# Patient Record
Sex: FEMALE | Race: WHITE | NOT HISPANIC OR LATINO | Employment: OTHER | URBAN - METROPOLITAN AREA
[De-identification: names, ages, dates, MRNs, and addresses within clinical notes are randomized per-mention and may not be internally consistent; named-entity substitution may affect disease eponyms.]

---

## 2023-06-26 ENCOUNTER — APPOINTMENT (OUTPATIENT)
Dept: PRIMARY CARE | Facility: CLINIC | Age: 78
End: 2023-06-26
Payer: MEDICARE

## 2023-07-11 DIAGNOSIS — I10 ESSENTIAL (PRIMARY) HYPERTENSION: ICD-10-CM

## 2023-07-11 DIAGNOSIS — K21.9 GASTROESOPHAGEAL REFLUX DISEASE WITHOUT ESOPHAGITIS: Primary | ICD-10-CM

## 2023-07-11 RX ORDER — TRIAMTERENE/HYDROCHLOROTHIAZID 37.5-25 MG
TABLET ORAL
Qty: 90 TABLET | Refills: 1 | Status: SHIPPED | OUTPATIENT
Start: 2023-07-11

## 2023-07-11 RX ORDER — PANTOPRAZOLE SODIUM 40 MG/1
TABLET, DELAYED RELEASE ORAL
Qty: 90 TABLET | Refills: 1 | Status: SHIPPED | OUTPATIENT
Start: 2023-07-11 | End: 2024-01-02

## 2023-09-14 DIAGNOSIS — E78.5 HYPERLIPIDEMIA, UNSPECIFIED: ICD-10-CM

## 2023-09-14 RX ORDER — SIMVASTATIN 20 MG/1
20 TABLET, FILM COATED ORAL EVERY EVENING
Qty: 90 TABLET | Refills: 1 | Status: SHIPPED | OUTPATIENT
Start: 2023-09-14 | End: 2023-09-19 | Stop reason: SDUPTHER

## 2023-09-15 PROBLEM — M17.12 OSTEOARTHRITIS OF LEFT KNEE: Status: ACTIVE | Noted: 2023-09-15

## 2023-09-15 PROBLEM — J38.02 BILATERAL VOCAL CORD PARESIS: Status: ACTIVE | Noted: 2023-09-15

## 2023-09-15 PROBLEM — R21 RASH AND OTHER NONSPECIFIC SKIN ERUPTION: Status: ACTIVE | Noted: 2021-11-16

## 2023-09-15 PROBLEM — I83.018: Status: ACTIVE | Noted: 2021-11-16

## 2023-09-15 PROBLEM — R35.1 NOCTURIA: Status: ACTIVE | Noted: 2023-09-15

## 2023-09-15 PROBLEM — Z98.890 HISTORY OF REVERSE TOTAL REPLACEMENT OF RIGHT SHOULDER JOINT: Status: ACTIVE | Noted: 2023-09-15

## 2023-09-15 PROBLEM — G89.29 CHRONIC RIGHT SHOULDER PAIN: Status: ACTIVE | Noted: 2023-09-15

## 2023-09-15 PROBLEM — L03.119 CELLULITIS, LEG: Status: ACTIVE | Noted: 2023-09-15

## 2023-09-15 PROBLEM — M70.61 TROCHANTERIC BURSITIS OF RIGHT HIP: Status: ACTIVE | Noted: 2023-09-15

## 2023-09-15 PROBLEM — K21.9 ACID REFLUX: Status: ACTIVE | Noted: 2023-09-15

## 2023-09-15 PROBLEM — K22.70 BARRETT'S ESOPHAGUS: Status: ACTIVE | Noted: 2023-09-15
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
## 2023-09-15 PROBLEM — J45.20 MILD INTERMITTENT ASTHMA WITHOUT COMPLICATION (HHS-HCC): Status: ACTIVE | Noted: 2023-09-15

## 2023-09-15 PROBLEM — R60.9 EDEMA: Status: ACTIVE | Noted: 2023-09-15

## 2023-09-15 PROBLEM — R32 URINARY INCONTINENCE: Status: ACTIVE | Noted: 2023-09-15

## 2023-09-15 PROBLEM — N32.81 OVERACTIVE BLADDER: Status: ACTIVE | Noted: 2023-09-15

## 2023-09-15 PROBLEM — M19.019 SHOULDER ARTHRITIS: Status: ACTIVE | Noted: 2023-09-15

## 2023-09-15 PROBLEM — E28.39 HYPOESTROGENISM: Status: ACTIVE | Noted: 2023-09-15

## 2023-09-15 PROBLEM — N18.30 CHRONIC KIDNEY DISEASE, STAGE 3 (MULTI): Status: ACTIVE | Noted: 2023-09-15

## 2023-09-15 PROBLEM — R05.3 CHRONIC COUGH: Status: ACTIVE | Noted: 2023-09-15

## 2023-09-15 PROBLEM — M62.81 MUSCLE WEAKNESS: Status: ACTIVE | Noted: 2023-09-15

## 2023-09-15 PROBLEM — I48.91 ATRIAL FIBRILLATION (MULTI): Status: ACTIVE | Noted: 2023-09-15

## 2023-09-15 PROBLEM — E55.9 VITAMIN D DEFICIENCY: Status: ACTIVE | Noted: 2023-09-15

## 2023-09-15 PROBLEM — J38.3 GLOTTIC INSUFFICIENCY: Status: ACTIVE | Noted: 2023-09-15

## 2023-09-15 PROBLEM — K59.09 CHRONIC CONSTIPATION: Status: ACTIVE | Noted: 2023-09-15

## 2023-09-15 PROBLEM — R00.1 BRADYCARDIA: Status: ACTIVE | Noted: 2023-09-15

## 2023-09-15 PROBLEM — K20.90 ESOPHAGITIS: Status: ACTIVE | Noted: 2023-09-15

## 2023-09-15 PROBLEM — E66.9 OBESITY: Status: ACTIVE | Noted: 2023-09-15

## 2023-09-15 PROBLEM — R00.2 PALPITATIONS: Status: ACTIVE | Noted: 2023-09-15

## 2023-09-15 PROBLEM — R13.12 OROPHARYNGEAL DYSPHAGIA: Status: ACTIVE | Noted: 2023-09-15

## 2023-09-15 PROBLEM — R26.89 IMBALANCE: Status: ACTIVE | Noted: 2023-09-15

## 2023-09-15 PROBLEM — E78.5 HYPERLIPIDEMIA: Status: ACTIVE | Noted: 2023-09-15

## 2023-09-15 PROBLEM — M25.511 CHRONIC RIGHT SHOULDER PAIN: Status: ACTIVE | Noted: 2023-09-15

## 2023-09-15 PROBLEM — N81.4 PROLAPSE, UTEROVAGINAL: Status: ACTIVE | Noted: 2023-09-15

## 2023-09-15 PROBLEM — K63.5 COLON POLYPS: Status: ACTIVE | Noted: 2023-09-15

## 2023-09-15 PROBLEM — R29.6 FALLING EPISODES: Status: ACTIVE | Noted: 2023-09-15

## 2023-09-15 PROBLEM — L50.8 URTICARIA, ACUTE: Status: ACTIVE | Noted: 2023-09-15

## 2023-09-15 PROBLEM — N95.0 POSTMENOPAUSAL BLEEDING: Status: ACTIVE | Noted: 2023-09-15

## 2023-09-15 PROBLEM — I10 HYPERTENSION: Status: ACTIVE | Noted: 2023-09-15

## 2023-09-15 PROBLEM — L27.0 GENERALIZED SKIN ERUPTION DUE TO DRUGS AND MEDICAMENTS TAKEN INTERNALLY: Status: ACTIVE | Noted: 2021-11-16

## 2023-09-15 PROBLEM — J30.2 OTHER SEASONAL ALLERGIC RHINITIS: Status: ACTIVE | Noted: 2023-09-15

## 2023-09-15 RX ORDER — CHLORHEXIDINE GLUCONATE ORAL RINSE 1.2 MG/ML
SOLUTION DENTAL
COMMUNITY
Start: 2022-10-11 | End: 2023-09-19 | Stop reason: ALTCHOICE

## 2023-09-15 RX ORDER — CEPHALEXIN 500 MG/1
500 CAPSULE ORAL 2 TIMES DAILY
COMMUNITY
Start: 2023-04-14 | End: 2023-09-19 | Stop reason: ALTCHOICE

## 2023-09-15 RX ORDER — GENTAMICIN SULFATE 1 MG/G
CREAM TOPICAL
COMMUNITY
Start: 2021-11-23 | End: 2023-09-19 | Stop reason: ALTCHOICE

## 2023-09-15 RX ORDER — HYDROXYZINE HYDROCHLORIDE 25 MG/1
25 TABLET, FILM COATED ORAL
COMMUNITY
Start: 2021-11-02 | End: 2024-03-26 | Stop reason: SINTOL

## 2023-09-15 RX ORDER — DOCUSATE SODIUM 100 MG/1
100 CAPSULE, LIQUID FILLED ORAL 2 TIMES DAILY
COMMUNITY
Start: 2022-10-21

## 2023-09-15 RX ORDER — TRIAMCINOLONE ACETONIDE 1 MG/G
1 CREAM TOPICAL
COMMUNITY
Start: 2021-11-16 | End: 2023-09-19 | Stop reason: ALTCHOICE

## 2023-09-15 RX ORDER — FAMOTIDINE 40 MG/1
1 TABLET, FILM COATED ORAL DAILY
COMMUNITY
Start: 2022-01-25 | End: 2024-01-02

## 2023-09-15 RX ORDER — ELECTROLYTES/DEXTROSE
1 SOLUTION, ORAL ORAL DAILY
COMMUNITY

## 2023-09-15 RX ORDER — APIXABAN 5 MG/1
5 TABLET, FILM COATED ORAL 2 TIMES DAILY
COMMUNITY
Start: 2022-11-18 | End: 2023-11-03 | Stop reason: SDUPTHER

## 2023-09-15 RX ORDER — METOPROLOL SUCCINATE 25 MG/1
1 TABLET, EXTENDED RELEASE ORAL DAILY
COMMUNITY
Start: 2022-10-24 | End: 2023-11-03 | Stop reason: SDUPTHER

## 2023-09-15 RX ORDER — ALBUTEROL SULFATE 90 UG/1
2 AEROSOL, METERED RESPIRATORY (INHALATION) EVERY 6 HOURS PRN
COMMUNITY
End: 2023-09-19 | Stop reason: ALTCHOICE

## 2023-09-15 RX ORDER — ACETAMINOPHEN 325 MG/1
325 TABLET ORAL EVERY 6 HOURS PRN
COMMUNITY
Start: 2022-10-21

## 2023-09-19 ENCOUNTER — LAB (OUTPATIENT)
Dept: LAB | Facility: LAB | Age: 78
End: 2023-09-19
Payer: MEDICARE

## 2023-09-19 ENCOUNTER — OFFICE VISIT (OUTPATIENT)
Dept: PRIMARY CARE | Facility: CLINIC | Age: 78
End: 2023-09-19
Payer: MEDICARE

## 2023-09-19 VITALS
SYSTOLIC BLOOD PRESSURE: 125 MMHG | WEIGHT: 191 LBS | TEMPERATURE: 97.6 F | BODY MASS INDEX: 31.78 KG/M2 | DIASTOLIC BLOOD PRESSURE: 61 MMHG | HEART RATE: 54 BPM

## 2023-09-19 DIAGNOSIS — E78.49 OTHER HYPERLIPIDEMIA: ICD-10-CM

## 2023-09-19 DIAGNOSIS — I10 PRIMARY HYPERTENSION: ICD-10-CM

## 2023-09-19 DIAGNOSIS — I83.018: ICD-10-CM

## 2023-09-19 DIAGNOSIS — I48.20 CHRONIC ATRIAL FIBRILLATION (MULTI): ICD-10-CM

## 2023-09-19 DIAGNOSIS — N18.31 STAGE 3A CHRONIC KIDNEY DISEASE (MULTI): ICD-10-CM

## 2023-09-19 DIAGNOSIS — N39.3 STRESS INCONTINENCE OF URINE: ICD-10-CM

## 2023-09-19 DIAGNOSIS — I10 PRIMARY HYPERTENSION: Primary | ICD-10-CM

## 2023-09-19 DIAGNOSIS — K21.9 GASTROESOPHAGEAL REFLUX DISEASE WITHOUT ESOPHAGITIS: ICD-10-CM

## 2023-09-19 DIAGNOSIS — E78.5 HYPERLIPIDEMIA, UNSPECIFIED: ICD-10-CM

## 2023-09-19 LAB
ALANINE AMINOTRANSFERASE (SGPT) (U/L) IN SER/PLAS: 17 U/L (ref 7–45)
ALBUMIN (G/DL) IN SER/PLAS: 4.3 G/DL (ref 3.4–5)
ALKALINE PHOSPHATASE (U/L) IN SER/PLAS: 84 U/L (ref 33–136)
ANION GAP IN SER/PLAS: 15 MMOL/L (ref 10–20)
ASPARTATE AMINOTRANSFERASE (SGOT) (U/L) IN SER/PLAS: 22 U/L (ref 9–39)
BILIRUBIN TOTAL (MG/DL) IN SER/PLAS: 0.6 MG/DL (ref 0–1.2)
CALCIUM (MG/DL) IN SER/PLAS: 10.3 MG/DL (ref 8.6–10.6)
CARBON DIOXIDE, TOTAL (MMOL/L) IN SER/PLAS: 28 MMOL/L (ref 21–32)
CHLORIDE (MMOL/L) IN SER/PLAS: 104 MMOL/L (ref 98–107)
CREATININE (MG/DL) IN SER/PLAS: 0.83 MG/DL (ref 0.5–1.05)
GFR FEMALE: 71 ML/MIN/1.73M2
GLUCOSE (MG/DL) IN SER/PLAS: 81 MG/DL (ref 74–99)
POTASSIUM (MMOL/L) IN SER/PLAS: 4.7 MMOL/L (ref 3.5–5.3)
PROTEIN TOTAL: 7.4 G/DL (ref 6.4–8.2)
SODIUM (MMOL/L) IN SER/PLAS: 142 MMOL/L (ref 136–145)
UREA NITROGEN (MG/DL) IN SER/PLAS: 24 MG/DL (ref 6–23)

## 2023-09-19 PROCEDURE — 1036F TOBACCO NON-USER: CPT | Performed by: INTERNAL MEDICINE

## 2023-09-19 PROCEDURE — 3078F DIAST BP <80 MM HG: CPT | Performed by: INTERNAL MEDICINE

## 2023-09-19 PROCEDURE — 80053 COMPREHEN METABOLIC PANEL: CPT

## 2023-09-19 PROCEDURE — 3074F SYST BP LT 130 MM HG: CPT | Performed by: INTERNAL MEDICINE

## 2023-09-19 PROCEDURE — 99213 OFFICE O/P EST LOW 20 MIN: CPT | Performed by: INTERNAL MEDICINE

## 2023-09-19 PROCEDURE — 1126F AMNT PAIN NOTED NONE PRSNT: CPT | Performed by: INTERNAL MEDICINE

## 2023-09-19 PROCEDURE — 36415 COLL VENOUS BLD VENIPUNCTURE: CPT

## 2023-09-19 RX ORDER — SIMVASTATIN 20 MG/1
20 TABLET, FILM COATED ORAL EVERY EVENING
Qty: 90 TABLET | Refills: 1 | Status: SHIPPED | OUTPATIENT
Start: 2023-09-19 | End: 2024-01-19

## 2023-09-19 ASSESSMENT — ENCOUNTER SYMPTOMS
NECK PAIN: 0
COUGH: 0
FEVER: 0
ARTHRALGIAS: 1
SHORTNESS OF BREATH: 0
TROUBLE SWALLOWING: 1
OCCASIONAL FEELINGS OF UNSTEADINESS: 0
PALPITATIONS: 0
DYSURIA: 0
FREQUENCY: 0
CONSTIPATION: 0
FATIGUE: 0
BLOOD IN STOOL: 0
HEADACHES: 0
DIARRHEA: 0
ABDOMINAL PAIN: 0
BACK PAIN: 0
MYALGIAS: 0
CHILLS: 0
LOSS OF SENSATION IN FEET: 0
DEPRESSION: 0
DIZZINESS: 0

## 2023-09-19 NOTE — PROGRESS NOTES
Subjective   Patient ID: Magali Cardenas is a 77 y.o. female.    HPI patient seen for routine follow-up.  His medical history significant for hypertension, hyperlipidemia, chronic atrial fibrillation, GERD, weakness, difficulty walking.  She had a slight fall at home.  She is currently using walker.  She is experiencing worsening pain in her right knee and is currently seeing orthopedics Dr. Braswell.  Review of Systems   Constitutional:  Negative for chills, fatigue and fever.   HENT:  Positive for trouble swallowing.    Respiratory:  Negative for cough and shortness of breath.    Cardiovascular:  Negative for chest pain, palpitations and leg swelling.   Gastrointestinal:  Negative for abdominal pain, blood in stool, constipation and diarrhea.   Endocrine: Negative for cold intolerance and heat intolerance.   Genitourinary:  Positive for urgency. Negative for dysuria and frequency.   Musculoskeletal:  Positive for arthralgias and gait problem. Negative for back pain, myalgias and neck pain.        R knee pain   Neurological:  Negative for dizziness and headaches.       Objective   Physical Exam  Vitals reviewed.   Constitutional:       General: She is not in acute distress.     Appearance: Normal appearance.   HENT:      Head: Normocephalic and atraumatic.   Cardiovascular:      Rate and Rhythm: Normal rate and regular rhythm.      Pulses: Normal pulses.   Pulmonary:      Effort: Pulmonary effort is normal. No respiratory distress.      Breath sounds: Normal breath sounds.   Abdominal:      General: Bowel sounds are normal. There is no distension.      Tenderness: There is no abdominal tenderness.   Musculoskeletal:         General: No swelling or tenderness. Normal range of motion.      Cervical back: Normal range of motion.   Skin:     General: Skin is warm.   Neurological:      General: No focal deficit present.      Mental Status: She is alert.      Motor: Weakness present.      Coordination: Coordination  normal.      Gait: Gait abnormal.   Psychiatric:         Mood and Affect: Mood normal.         Behavior: Behavior normal.         Assessment/Plan   Diagnoses and all orders for this visit:  Primary hypertension  Comments:  c/w mazxide  Orders:  -     Comprehensive Metabolic Panel; Future  Other hyperlipidemia  Comments:  c/w simvastatin  Orders:  -     simvastatin (Zocor) 20 mg tablet; Take 1 tablet (20 mg) by mouth once daily in the evening.  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

## 2023-10-17 ENCOUNTER — APPOINTMENT (OUTPATIENT)
Dept: ORTHOPEDIC SURGERY | Facility: CLINIC | Age: 78
End: 2023-10-17
Payer: MEDICARE

## 2023-10-31 ENCOUNTER — APPOINTMENT (OUTPATIENT)
Dept: ORTHOPEDIC SURGERY | Facility: CLINIC | Age: 78
End: 2023-10-31
Payer: MEDICARE

## 2023-11-02 ENCOUNTER — APPOINTMENT (OUTPATIENT)
Dept: ORTHOPEDIC SURGERY | Facility: CLINIC | Age: 78
End: 2023-11-02
Payer: MEDICARE

## 2023-11-03 DIAGNOSIS — I48.91 ATRIAL FIBRILLATION, UNSPECIFIED TYPE (MULTI): ICD-10-CM

## 2023-11-03 RX ORDER — METOPROLOL SUCCINATE 25 MG/1
25 TABLET, EXTENDED RELEASE ORAL DAILY
Qty: 90 TABLET | Refills: 3 | Status: SHIPPED | OUTPATIENT
Start: 2023-11-03 | End: 2023-11-21 | Stop reason: SDUPTHER

## 2023-11-03 RX ORDER — APIXABAN 5 MG/1
5 TABLET, FILM COATED ORAL 2 TIMES DAILY
Qty: 180 TABLET | Refills: 3 | Status: SHIPPED | OUTPATIENT
Start: 2023-11-03 | End: 2023-11-21 | Stop reason: SDUPTHER

## 2023-11-07 ENCOUNTER — OFFICE VISIT (OUTPATIENT)
Dept: ORTHOPEDIC SURGERY | Facility: HOSPITAL | Age: 78
End: 2023-11-07
Payer: MEDICARE

## 2023-11-07 ENCOUNTER — HOSPITAL ENCOUNTER (OUTPATIENT)
Dept: RADIOLOGY | Facility: HOSPITAL | Age: 78
Discharge: HOME | End: 2023-11-07
Payer: MEDICARE

## 2023-11-07 VITALS — BODY MASS INDEX: 31.65 KG/M2 | HEIGHT: 65 IN | WEIGHT: 190 LBS

## 2023-11-07 DIAGNOSIS — Z47.1 AFTERCARE FOLLOWING JOINT REPLACEMENT SURGERY, UNSPECIFIED JOINT: Primary | ICD-10-CM

## 2023-11-07 DIAGNOSIS — Z47.1 AFTERCARE FOLLOWING JOINT REPLACEMENT SURGERY, UNSPECIFIED JOINT: ICD-10-CM

## 2023-11-07 PROCEDURE — 99213 OFFICE O/P EST LOW 20 MIN: CPT | Performed by: NURSE PRACTITIONER

## 2023-11-07 PROCEDURE — 73030 X-RAY EXAM OF SHOULDER: CPT | Mod: RT,FY

## 2023-11-07 PROCEDURE — 1036F TOBACCO NON-USER: CPT | Performed by: NURSE PRACTITIONER

## 2023-11-07 PROCEDURE — 1126F AMNT PAIN NOTED NONE PRSNT: CPT | Performed by: NURSE PRACTITIONER

## 2023-11-07 PROCEDURE — 1159F MED LIST DOCD IN RCRD: CPT | Performed by: NURSE PRACTITIONER

## 2023-11-07 PROCEDURE — 1160F RVW MEDS BY RX/DR IN RCRD: CPT | Performed by: NURSE PRACTITIONER

## 2023-11-07 PROCEDURE — 73030 X-RAY EXAM OF SHOULDER: CPT | Mod: RIGHT SIDE | Performed by: RADIOLOGY

## 2023-11-07 ASSESSMENT — PAIN - FUNCTIONAL ASSESSMENT: PAIN_FUNCTIONAL_ASSESSMENT: NO/DENIES PAIN

## 2023-11-07 NOTE — PROGRESS NOTES
Provider Impression/Assessment:  BENITO is 12 months status post right reverse total shoulder arthroplasty.    Patient Discussion/Summary:  She has healed very nicely since surgery. She is encouraged to continue to work on scapular stabilizing exercises as well as stretching exercises. We talked again about no restrictions at this time. Emphasis on self-directed daily exercise program. Dental prophylaxis antibiotic needed prior to dental procedures in the future. She is aware that we will send prescription for antibiotic as needed, on going as needed.     At this point we can see her back as needed. All questions were answered today and she is comfortable with the above plan. Patient was discussed with Dr Hernandez and he agrees with the above plan.    Should you have any questions or concerns after your visit today, please do not hesitate to call the office at [phone]. I am honored to be a provider on your health care team and remain dedicated to helping you achieve your healthcare goals    ----------------------------------------------------------------------------------------------  CHIEF COMPLAINT:  FOLLOW UP RIGHT SHOULDER   POV R RTSR  DOS: 10/19/22      History of Present Illness  BENITO DONIS is a 78 year old right-hand-dominant female returning today for a follow up visit. She is status post right shoulder total reverse replacement and biceps tenodesis on 10/19/22.    She reports doing very well now since surgery. No complaints of pain today. No new concerns. She has some weakness with extension and overhead, but her range of motion has improved nicely since working with physical therapy. She is very happy now with the pain relief that she has gotten since replacement and improved range of motion. She denies use of any pain medication at this time for her shoulder. She is sleeping without difficulty. She denies redness or warmth at incision site. Denies any fever, chills, or paresthesia. She has  been able to actively maneuver with her rollator independently. She is able to complete her daily tasks of dressing and fixing her hair. She has completed therapy now. Exercising at home.     11/7/23  BENITO DONIS returns to clinic for her right shoulder. She is 1 year S/P right reverse total shoulder replacement, done 10/19/22. She is doing well and denies any new injury to her shoulder. No issues or concerns to report.     Past medical history, surgical history, social history, and family history were all reviewed and are as per the [place] patient health history questionnaire form that was signed and scanned into the chart.     Review of Systems:   Review of systems for all 14 systems is negative for complaint except for the above noted findings in the history of present illness.    Family, social, and medical histories are obtained and reviewed.    Diagnoses/Problems:   · Follow-up examination after orthopedic surgery (V67.09) (Z09)   · Shoulder arthritis (716.91) (M19.019)    Physical Examination:  Patient is a well-developed, well-nourished female in no acute distress. Breathes with normal chest rises. Pupils are round and symmetric today. Awake, alert, and oriented x3.      Examination of the right shoulder today reveals the skin to be intact. Well healed incision. Patient had 5 out of 5 wrist flexors extension, thumb extension, bilaterally. Full active range of motion of elbow. Range of motion revealed 150° of forward elevation actively without pain. 40° of external rotation. Internal rotation to L3. Abduction to 140°.      Results/Data  Xrays today show excellent alignment status post reverse shoulder replacement, showing healed subscapularis. No evidence of fracture, dislocation of other acute findings. I personally spent time viewing digital images of the radiology testing with the patient.     *While intending to generate a timely document that accurately reflects the content of the visit, no guarantee  can be provided that every grammatical or spelling mistake has been or will be identified or corrected. Thank you for your understanding.

## 2023-11-09 ENCOUNTER — OFFICE VISIT (OUTPATIENT)
Dept: ORTHOPEDIC SURGERY | Facility: CLINIC | Age: 78
End: 2023-11-09
Payer: MEDICARE

## 2023-11-09 ENCOUNTER — ANCILLARY PROCEDURE (OUTPATIENT)
Dept: RADIOLOGY | Facility: CLINIC | Age: 78
End: 2023-11-09
Payer: MEDICARE

## 2023-11-09 DIAGNOSIS — M70.61 TROCHANTERIC BURSITIS, RIGHT HIP: Primary | ICD-10-CM

## 2023-11-09 DIAGNOSIS — Z47.1 AFTERCARE FOLLOWING JOINT REPLACEMENT SURGERY, UNSPECIFIED JOINT: ICD-10-CM

## 2023-11-09 DIAGNOSIS — M70.61 TROCHANTERIC BURSITIS OF RIGHT HIP: ICD-10-CM

## 2023-11-09 PROCEDURE — 1126F AMNT PAIN NOTED NONE PRSNT: CPT | Performed by: ORTHOPAEDIC SURGERY

## 2023-11-09 PROCEDURE — 1159F MED LIST DOCD IN RCRD: CPT | Performed by: ORTHOPAEDIC SURGERY

## 2023-11-09 PROCEDURE — 3078F DIAST BP <80 MM HG: CPT | Performed by: ORTHOPAEDIC SURGERY

## 2023-11-09 PROCEDURE — 73562 X-RAY EXAM OF KNEE 3: CPT | Mod: RIGHT SIDE | Performed by: RADIOLOGY

## 2023-11-09 PROCEDURE — 73562 X-RAY EXAM OF KNEE 3: CPT | Mod: RT

## 2023-11-09 PROCEDURE — 2500000005 HC RX 250 GENERAL PHARMACY W/O HCPCS: Performed by: ORTHOPAEDIC SURGERY

## 2023-11-09 PROCEDURE — 3074F SYST BP LT 130 MM HG: CPT | Performed by: ORTHOPAEDIC SURGERY

## 2023-11-09 PROCEDURE — 1036F TOBACCO NON-USER: CPT | Performed by: ORTHOPAEDIC SURGERY

## 2023-11-09 PROCEDURE — 99213 OFFICE O/P EST LOW 20 MIN: CPT | Mod: 25 | Performed by: ORTHOPAEDIC SURGERY

## 2023-11-09 PROCEDURE — 99213 OFFICE O/P EST LOW 20 MIN: CPT | Performed by: ORTHOPAEDIC SURGERY

## 2023-11-09 PROCEDURE — 2500000004 HC RX 250 GENERAL PHARMACY W/ HCPCS (ALT 636 FOR OP/ED): Performed by: ORTHOPAEDIC SURGERY

## 2023-11-10 PROCEDURE — 20610 DRAIN/INJ JOINT/BURSA W/O US: CPT | Performed by: ORTHOPAEDIC SURGERY

## 2023-11-10 RX ORDER — TRIAMCINOLONE ACETONIDE 40 MG/ML
2 INJECTION, SUSPENSION INTRA-ARTICULAR; INTRAMUSCULAR
Status: COMPLETED | OUTPATIENT
Start: 2023-11-10 | End: 2023-11-10

## 2023-11-10 RX ORDER — LIDOCAINE HYDROCHLORIDE 10 MG/ML
8 INJECTION INFILTRATION; PERINEURAL
Status: COMPLETED | OUTPATIENT
Start: 2023-11-10 | End: 2023-11-10

## 2023-11-10 RX ADMIN — TRIAMCINOLONE ACETONIDE 2 ML: 40 INJECTION, SUSPENSION INTRA-ARTICULAR; INTRAMUSCULAR at 12:19

## 2023-11-10 RX ADMIN — LIDOCAINE HYDROCHLORIDE 8 ML: 10 INJECTION, SOLUTION INFILTRATION; PERINEURAL at 12:19

## 2023-11-10 NOTE — PROGRESS NOTES
Patient is a 78-year-old female presents today for evaluation of right leg pain.  She points to the lateral aspect of her leg beginning at the trochanter extending down to her knee.  She does have some aches and pains around the knee.  She had previous right total knee replacement with Dr. Flynn.  She had no postoperative complications.    Right leg:  AAOx3, NAD, walks with a moderate antalgic gait  No pain with flexion internal rotation  Negative Stinchfield  Moderate TTP over trochanter laterally which she says reproduces her pain  5/5 Hip flexion/knee extension/df/pf/ehl  SILT in a luzma/saph/per/tib distribution  ½ dorsalis pedis and posterior tibial pulse  no popliteal or inguinal lymphadenopathy  no other overlying lesions  mood: euthymic  Respirations non labored    Plain films of her total knee arthroplasty were reviewed with myself in clinic today.  She has a cemented cruciate retaining [place] triathlon in place with no signs of loosening or failure.  Her x-rays are unchanged from previous.    I discussed that I think most of this is peritrochanteric soft tissue pain extending into her iliotibial band.  She underwent a Trope injection today in clinic.  Hopefully this will provide some relief.  We will see how she does over time.  She is not recommending surgical at this time.  All questions were answered.    L Inj/Asp: R greater trochanteric bursa on 11/10/2023 12:19 PM  Indications: pain  Details: 22 G (Spinal Needle) needle, lateral approach  Medications: 2 mL triamcinolone acetonide 40 mg/mL; 8 mL lidocaine 10 mg/mL (1 %)      Greater Trochanteric bursitis cortisone injection procedure note:  Discussion:  I discussed the conservative treatment options for Greater Trochanteric Bursitis including but not limited to physical therapy, oral NSAIDS, activity and lifestyle modification, and corticosteroid injections. Pt has elected to try a cortisone injection today. I have explained the risk and benefits of an  injection including the possibility of joint infection, bleeding, damage to cartilage, allergic reaction. Patient verbalized understanding and gave verbal consent wishes to proceed with a intraarticular cortisone injection for their peribursal area.    Procedure    With the patient's informed verbal consent, the right hip greater trochanteric area was prepped in standard sterile fashion with Chlorhexidine in a sidelying position. The skin was then anesthetized with ethyl chloride spray and cleaned again with Chlorhexidine. The peritrochanteric soft tissue was then injected with a prefilled spinal needle syringe of 80 mg Kenalog +8 ml Lidocaine without complications.  The patient tolerated this well and felt immediate initial relief of symptoms. A bandaid was applied and the patient ambulated out of the clinic on ther own accord without difficulty. Patient should contact the office if any signs of of infection appear: redness, fever, chills, drainage, swelling or warmth to the knees.  Pt understands that the injections can be repeated no sooner than 3 months.      Procedure, treatment alternatives, risks and benefits explained, specific risks discussed. Consent was given by the patient. Immediately prior to procedure a time out was called to verify the correct patient, procedure, equipment, support staff and site/side marked as required. Patient was prepped and draped in the usual sterile fashion.

## 2023-11-21 DIAGNOSIS — I48.91 ATRIAL FIBRILLATION, UNSPECIFIED TYPE (MULTI): ICD-10-CM

## 2023-11-21 PROBLEM — Z47.1 AFTERCARE FOLLOWING JOINT REPLACEMENT SURGERY: Status: ACTIVE | Noted: 2023-11-21

## 2023-11-21 RX ORDER — APIXABAN 5 MG/1
5 TABLET, FILM COATED ORAL 2 TIMES DAILY
Qty: 180 TABLET | Refills: 3 | Status: SHIPPED | OUTPATIENT
Start: 2023-11-21

## 2023-11-21 RX ORDER — PHENYLPROPANOLAMINE/CLEMASTINE 75-1.34MG
200 TABLET, EXTENDED RELEASE ORAL 4 TIMES DAILY PRN
COMMUNITY

## 2023-11-21 RX ORDER — VIT C/E/ZN/COPPR/LUTEIN/ZEAXAN 250MG-90MG
1000 CAPSULE ORAL
COMMUNITY

## 2023-11-21 RX ORDER — METOPROLOL SUCCINATE 25 MG/1
25 TABLET, EXTENDED RELEASE ORAL DAILY
Qty: 90 TABLET | Refills: 3 | Status: SHIPPED | OUTPATIENT
Start: 2023-11-21

## 2023-11-21 RX ORDER — SOLIFENACIN SUCCINATE 10 MG/1
5 TABLET, FILM COATED ORAL
COMMUNITY
End: 2024-03-26 | Stop reason: SINTOL

## 2023-12-28 DIAGNOSIS — K21.9 GASTROESOPHAGEAL REFLUX DISEASE WITHOUT ESOPHAGITIS: ICD-10-CM

## 2024-01-02 RX ORDER — FAMOTIDINE 40 MG/1
40 TABLET, FILM COATED ORAL DAILY
Qty: 90 TABLET | Refills: 2 | Status: SHIPPED | OUTPATIENT
Start: 2024-01-02

## 2024-01-02 RX ORDER — PANTOPRAZOLE SODIUM 40 MG/1
TABLET, DELAYED RELEASE ORAL
Qty: 90 TABLET | Refills: 2 | Status: SHIPPED | OUTPATIENT
Start: 2024-01-02

## 2024-01-18 DIAGNOSIS — E78.5 HYPERLIPIDEMIA, UNSPECIFIED: ICD-10-CM

## 2024-01-18 DIAGNOSIS — E78.49 OTHER HYPERLIPIDEMIA: ICD-10-CM

## 2024-01-19 RX ORDER — SIMVASTATIN 20 MG/1
20 TABLET, FILM COATED ORAL EVERY EVENING
Qty: 90 TABLET | Refills: 2 | Status: SHIPPED | OUTPATIENT
Start: 2024-01-19

## 2024-03-26 ENCOUNTER — OFFICE VISIT (OUTPATIENT)
Dept: PRIMARY CARE | Facility: CLINIC | Age: 79
End: 2024-03-26
Payer: MEDICARE

## 2024-03-26 ENCOUNTER — APPOINTMENT (OUTPATIENT)
Dept: LAB | Facility: LAB | Age: 79
End: 2024-03-26
Payer: MEDICARE

## 2024-03-26 ENCOUNTER — LAB (OUTPATIENT)
Dept: LAB | Facility: LAB | Age: 79
End: 2024-03-26
Payer: MEDICARE

## 2024-03-26 VITALS
HEIGHT: 64 IN | SYSTOLIC BLOOD PRESSURE: 123 MMHG | BODY MASS INDEX: 32.27 KG/M2 | DIASTOLIC BLOOD PRESSURE: 59 MMHG | HEART RATE: 53 BPM | WEIGHT: 189 LBS | TEMPERATURE: 97.8 F

## 2024-03-26 DIAGNOSIS — R73.09 ELEVATED GLUCOSE LEVEL: ICD-10-CM

## 2024-03-26 DIAGNOSIS — Z00.00 MEDICARE ANNUAL WELLNESS VISIT, SUBSEQUENT: Primary | ICD-10-CM

## 2024-03-26 DIAGNOSIS — I10 PRIMARY HYPERTENSION: ICD-10-CM

## 2024-03-26 DIAGNOSIS — N18.31 STAGE 3A CHRONIC KIDNEY DISEASE (MULTI): ICD-10-CM

## 2024-03-26 DIAGNOSIS — M79.604 RIGHT LEG PAIN: ICD-10-CM

## 2024-03-26 DIAGNOSIS — I83.018: ICD-10-CM

## 2024-03-26 DIAGNOSIS — I48.20 CHRONIC ATRIAL FIBRILLATION (MULTI): ICD-10-CM

## 2024-03-26 DIAGNOSIS — E78.49 OTHER HYPERLIPIDEMIA: ICD-10-CM

## 2024-03-26 PROBLEM — Z47.1 AFTERCARE FOLLOWING JOINT REPLACEMENT SURGERY: Status: RESOLVED | Noted: 2023-11-21 | Resolved: 2024-03-26

## 2024-03-26 PROBLEM — R00.1 BRADYCARDIA: Status: RESOLVED | Noted: 2023-09-15 | Resolved: 2024-03-26

## 2024-03-26 PROBLEM — L03.119 CELLULITIS, LEG: Status: RESOLVED | Noted: 2023-09-15 | Resolved: 2024-03-26

## 2024-03-26 PROBLEM — R00.2 PALPITATIONS: Status: RESOLVED | Noted: 2023-09-15 | Resolved: 2024-03-26
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
## 2024-03-26 PROBLEM — K20.90 ESOPHAGITIS: Status: RESOLVED | Noted: 2023-09-15 | Resolved: 2024-03-26

## 2024-03-26 LAB
ALBUMIN SERPL BCP-MCNC: 4.4 G/DL (ref 3.4–5)
ALP SERPL-CCNC: 74 U/L (ref 33–136)
ALT SERPL W P-5'-P-CCNC: 15 U/L (ref 7–45)
ANION GAP SERPL CALC-SCNC: 16 MMOL/L (ref 10–20)
AST SERPL W P-5'-P-CCNC: 20 U/L (ref 9–39)
BILIRUB SERPL-MCNC: 0.6 MG/DL (ref 0–1.2)
BUN SERPL-MCNC: 24 MG/DL (ref 6–23)
CALCIUM SERPL-MCNC: 10.4 MG/DL (ref 8.6–10.6)
CHLORIDE SERPL-SCNC: 101 MMOL/L (ref 98–107)
CO2 SERPL-SCNC: 25 MMOL/L (ref 21–32)
CREAT SERPL-MCNC: 0.97 MG/DL (ref 0.5–1.05)
EGFRCR SERPLBLD CKD-EPI 2021: 59 ML/MIN/1.73M*2
ERYTHROCYTE [DISTWIDTH] IN BLOOD BY AUTOMATED COUNT: 12.1 % (ref 11.5–14.5)
EST. AVERAGE GLUCOSE BLD GHB EST-MCNC: 100 MG/DL
GLUCOSE SERPL-MCNC: 81 MG/DL (ref 74–99)
HBA1C MFR BLD: 5.1 %
HCT VFR BLD AUTO: 43.8 % (ref 36–46)
HGB BLD-MCNC: 14 G/DL (ref 12–16)
MCH RBC QN AUTO: 31 PG (ref 26–34)
MCHC RBC AUTO-ENTMCNC: 32 G/DL (ref 32–36)
MCV RBC AUTO: 97 FL (ref 80–100)
NRBC BLD-RTO: 0 /100 WBCS (ref 0–0)
PLATELET # BLD AUTO: 308 X10*3/UL (ref 150–450)
POTASSIUM SERPL-SCNC: 4.9 MMOL/L (ref 3.5–5.3)
PROT SERPL-MCNC: 7.2 G/DL (ref 6.4–8.2)
RBC # BLD AUTO: 4.52 X10*6/UL (ref 4–5.2)
SODIUM SERPL-SCNC: 137 MMOL/L (ref 136–145)
TSH SERPL-ACNC: 1.83 MIU/L (ref 0.44–3.98)
WBC # BLD AUTO: 6.2 X10*3/UL (ref 4.4–11.3)

## 2024-03-26 PROCEDURE — 83036 HEMOGLOBIN GLYCOSYLATED A1C: CPT

## 2024-03-26 PROCEDURE — 1170F FXNL STATUS ASSESSED: CPT | Performed by: INTERNAL MEDICINE

## 2024-03-26 PROCEDURE — G0439 PPPS, SUBSEQ VISIT: HCPCS | Performed by: INTERNAL MEDICINE

## 2024-03-26 PROCEDURE — 99213 OFFICE O/P EST LOW 20 MIN: CPT | Performed by: INTERNAL MEDICINE

## 2024-03-26 PROCEDURE — 1160F RVW MEDS BY RX/DR IN RCRD: CPT | Performed by: INTERNAL MEDICINE

## 2024-03-26 PROCEDURE — 3078F DIAST BP <80 MM HG: CPT | Performed by: INTERNAL MEDICINE

## 2024-03-26 PROCEDURE — 1036F TOBACCO NON-USER: CPT | Performed by: INTERNAL MEDICINE

## 2024-03-26 PROCEDURE — 80053 COMPREHEN METABOLIC PANEL: CPT

## 2024-03-26 PROCEDURE — 85027 COMPLETE CBC AUTOMATED: CPT

## 2024-03-26 PROCEDURE — 84443 ASSAY THYROID STIM HORMONE: CPT

## 2024-03-26 PROCEDURE — 3074F SYST BP LT 130 MM HG: CPT | Performed by: INTERNAL MEDICINE

## 2024-03-26 PROCEDURE — 1159F MED LIST DOCD IN RCRD: CPT | Performed by: INTERNAL MEDICINE

## 2024-03-26 PROCEDURE — 36415 COLL VENOUS BLD VENIPUNCTURE: CPT

## 2024-03-26 RX ORDER — DICLOFENAC SODIUM 10 MG/G
4 GEL TOPICAL 4 TIMES DAILY
Qty: 100 G | Refills: 1 | Status: SHIPPED | OUTPATIENT
Start: 2024-03-26

## 2024-03-26 ASSESSMENT — ENCOUNTER SYMPTOMS
PALPITATIONS: 0
LIGHT-HEADEDNESS: 0
BACK PAIN: 0
OCCASIONAL FEELINGS OF UNSTEADINESS: 1
HEADACHES: 0
SLEEP DISTURBANCE: 0
NECK PAIN: 0
LOSS OF SENSATION IN FEET: 0
UNEXPECTED WEIGHT CHANGE: 0
DIZZINESS: 0
SORE THROAT: 0
DEPRESSION: 0
APPETITE CHANGE: 0
CHILLS: 0
FREQUENCY: 0
DYSURIA: 0
DECREASED CONCENTRATION: 0
WHEEZING: 0
SHORTNESS OF BREATH: 0
ARTHRALGIAS: 1
FLANK PAIN: 0
NERVOUS/ANXIOUS: 0
ABDOMINAL PAIN: 0
DIFFICULTY URINATING: 0
ACTIVITY CHANGE: 0
COUGH: 0
WEAKNESS: 1
BLOOD IN STOOL: 0
CHEST TIGHTNESS: 0

## 2024-03-26 ASSESSMENT — ACTIVITIES OF DAILY LIVING (ADL)
DOING_HOUSEWORK: INDEPENDENT
BATHING: INDEPENDENT
TAKING_MEDICATION: INDEPENDENT
GROCERY_SHOPPING: INDEPENDENT
DRESSING: INDEPENDENT
MANAGING_FINANCES: INDEPENDENT

## 2024-03-26 ASSESSMENT — PATIENT HEALTH QUESTIONNAIRE - PHQ9
1. LITTLE INTEREST OR PLEASURE IN DOING THINGS: NOT AT ALL
2. FEELING DOWN, DEPRESSED OR HOPELESS: MORE THAN HALF THE DAYS
10. IF YOU CHECKED OFF ANY PROBLEMS, HOW DIFFICULT HAVE THESE PROBLEMS MADE IT FOR YOU TO DO YOUR WORK, TAKE CARE OF THINGS AT HOME, OR GET ALONG WITH OTHER PEOPLE: SOMEWHAT DIFFICULT
SUM OF ALL RESPONSES TO PHQ9 QUESTIONS 1 AND 2: 2

## 2024-03-26 NOTE — PATIENT INSTRUCTIONS
Restart Eliquis at low-dose-2.5 mg twice a day  Do not take ibuprofen or other NSAIDS  Start Voltaren gel for your joint pains  Continue rest of the medications  Follow-up in 3 to 4 months

## 2024-03-26 NOTE — PROGRESS NOTES
"Subjective   Patient ID: Magali Cardenas is a 78 y.o. female who presents for Medicare Annual Wellness Visit Subsequent (Pt present today for wellness visit. ls).    HPI Ms. Cardenas is seen today for Medicare wellness visit and follow-up.  Her medical history significant for atrial fibrillation, hypertension, hyperlipidemia, imbalance.  She has been experiencing pain in her legs.  She was diagnosed with tendinitis in her right leg.  She started taking ibuprofen.  She stopped taking Eliquis.  She uses walker for ambulation.  She lives with her .    Review of Systems   Constitutional:  Negative for activity change, appetite change, chills and unexpected weight change.   HENT:  Negative for congestion, postnasal drip and sore throat.    Eyes:  Negative for visual disturbance.   Respiratory:  Negative for cough, chest tightness, shortness of breath and wheezing.    Cardiovascular:  Negative for chest pain, palpitations and leg swelling.   Gastrointestinal:  Negative for abdominal pain and blood in stool.   Endocrine: Negative for cold intolerance and heat intolerance.   Genitourinary:  Negative for difficulty urinating, dysuria, flank pain and frequency.   Musculoskeletal:  Positive for arthralgias. Negative for back pain, gait problem and neck pain.        R leg pain   Skin:  Negative for rash.   Allergic/Immunologic: Negative for environmental allergies and food allergies.   Neurological:  Positive for weakness. Negative for dizziness, light-headedness and headaches.        Diff walking   Psychiatric/Behavioral:  Negative for decreased concentration and sleep disturbance. The patient is not nervous/anxious.        Objective   /59   Pulse 53   Temp 36.6 °C (97.8 °F)   Ht 1.614 m (5' 3.54\")   Wt 85.7 kg (189 lb)   BMI 32.91 kg/m²     Physical Exam  Vitals reviewed.   Constitutional:       General: She is not in acute distress.     Appearance: Normal appearance.   HENT:      Head: Normocephalic and " atraumatic.   Cardiovascular:      Rate and Rhythm: Normal rate and regular rhythm.      Pulses: Normal pulses.   Pulmonary:      Effort: Pulmonary effort is normal. No respiratory distress.      Breath sounds: Normal breath sounds.   Abdominal:      General: Bowel sounds are normal. There is no distension.      Tenderness: There is no abdominal tenderness.   Musculoskeletal:         General: No swelling or tenderness. Normal range of motion.   Skin:     General: Skin is warm.   Neurological:      General: No focal deficit present.      Mental Status: She is alert.      Motor: Weakness present.      Coordination: Coordination normal.      Gait: Gait abnormal.   Psychiatric:         Mood and Affect: Mood normal.         Behavior: Behavior normal.         Assessment/Plan   Diagnoses and all orders for this visit:  Medicare annual wellness visit, subsequent  Comments:  Wellness visit completed  No care gaps found  Primary hypertension  Comments:  Stable  Orders:  -     CBC; Future  -     Comprehensive Metabolic Panel; Future  -     TSH with reflex to Free T4 if abnormal; Future  Other hyperlipidemia  Comments:  Stable  Chronic atrial fibrillation (CMS/[facility])  Comments:  Continue metoprolol  Restart Eliquis at low-dose-2.5 mg twice daily due to high fall risk and potential risk of bleeding  Right leg pain  Comments:  Tendinitis-apply Voltaren topical gel  Consider using ice or heat  Orders:  -     diclofenac sodium (Voltaren) 1 % gel; Apply 4.5 inches (4 g) topically 4 times a day.  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

## 2024-03-28 ENCOUNTER — TELEPHONE (OUTPATIENT)
Dept: PRIMARY CARE | Facility: CLINIC | Age: 79
End: 2024-03-28
Payer: MEDICARE

## 2024-05-31 PROBLEM — S01.91XA LACERATION OF HEAD: Status: ACTIVE | Noted: 2024-05-31

## 2024-05-31 PROBLEM — M25.519 SHOULDER PAIN: Status: ACTIVE | Noted: 2024-05-31

## 2024-05-31 PROBLEM — J38.3 DISORDER OF VOCAL CORD: Status: ACTIVE | Noted: 2023-09-15

## 2024-05-31 PROBLEM — R73.09 INCREASED GLUCOSE LEVEL: Status: ACTIVE | Noted: 2024-05-31

## 2024-05-31 PROBLEM — I70.203 ATHEROSCLEROSIS OF NATIVE ARTERY OF BOTH LOWER EXTREMITIES (CMS-HCC): Status: ACTIVE | Noted: 2019-03-26

## 2024-05-31 PROBLEM — L50.1 IDIOPATHIC URTICARIA: Status: ACTIVE | Noted: 2021-11-16

## 2024-05-31 PROBLEM — B35.1 ONYCHOMYCOSIS: Status: ACTIVE | Noted: 2019-03-26

## 2024-05-31 PROBLEM — I48.0 PAROXYSMAL ATRIAL FIBRILLATION WITH RAPID VENTRICULAR RESPONSE (MULTI): Status: ACTIVE | Noted: 2023-09-15

## 2024-05-31 PROBLEM — S00.93XA CONTUSION OF HEAD: Status: ACTIVE | Noted: 2024-05-31

## 2024-05-31 PROBLEM — M25.569 KNEE PAIN: Status: ACTIVE | Noted: 2024-05-31

## 2024-05-31 PROBLEM — J38.02 BILATERAL PARTIAL PARALYSIS OF VOCAL CORDS OR LARYNX: Status: ACTIVE | Noted: 2023-09-15

## 2024-05-31 PROBLEM — M19.071 ARTHRITIS OF FOOT, RIGHT: Status: ACTIVE | Noted: 2019-03-26

## 2024-05-31 PROBLEM — M79.671 PAIN IN RIGHT FOOT: Status: ACTIVE | Noted: 2019-03-26

## 2024-05-31 PROBLEM — M70.60 GREATER TROCHANTERIC BURSITIS: Status: ACTIVE | Noted: 2024-05-31

## 2024-05-31 PROBLEM — M25.559 ARTHRALGIA OF HIP: Status: ACTIVE | Noted: 2024-05-31

## 2024-06-20 PROBLEM — L27.0 GENERALIZED SKIN ERUPTION DUE TO DRUGS AND MEDICAMENTS TAKEN INTERNALLY: Status: RESOLVED | Noted: 2021-11-16 | Resolved: 2024-06-20

## 2024-06-20 PROBLEM — R21 RASH AND OTHER NONSPECIFIC SKIN ERUPTION: Status: RESOLVED | Noted: 2021-11-16 | Resolved: 2024-06-20

## 2024-06-20 PROBLEM — M25.519 SHOULDER PAIN: Status: RESOLVED | Noted: 2024-05-31 | Resolved: 2024-06-20

## 2024-06-20 PROBLEM — M79.671 PAIN IN RIGHT FOOT: Status: RESOLVED | Noted: 2019-03-26 | Resolved: 2024-06-20

## 2024-06-20 PROBLEM — J38.3 DISORDER OF VOCAL CORD: Status: RESOLVED | Noted: 2023-09-15 | Resolved: 2024-06-20

## 2024-06-20 PROBLEM — R73.09 INCREASED GLUCOSE LEVEL: Status: RESOLVED | Noted: 2024-05-31 | Resolved: 2024-06-20

## 2024-06-20 PROBLEM — S00.93XA CONTUSION OF HEAD: Status: RESOLVED | Noted: 2024-05-31 | Resolved: 2024-06-20

## 2024-06-20 PROBLEM — J38.02 BILATERAL VOCAL CORD PARESIS: Status: RESOLVED | Noted: 2023-09-15 | Resolved: 2024-06-20

## 2024-06-27 ENCOUNTER — APPOINTMENT (OUTPATIENT)
Dept: PRIMARY CARE | Facility: CLINIC | Age: 79
End: 2024-06-27
Payer: MEDICARE

## 2024-06-27 VITALS
DIASTOLIC BLOOD PRESSURE: 73 MMHG | BODY MASS INDEX: 31.76 KG/M2 | WEIGHT: 182.4 LBS | TEMPERATURE: 97.4 F | SYSTOLIC BLOOD PRESSURE: 132 MMHG | HEART RATE: 62 BPM

## 2024-06-27 DIAGNOSIS — G89.29 CHRONIC PAIN OF RIGHT KNEE: ICD-10-CM

## 2024-06-27 DIAGNOSIS — K21.9 GASTROESOPHAGEAL REFLUX DISEASE WITHOUT ESOPHAGITIS: ICD-10-CM

## 2024-06-27 DIAGNOSIS — M25.561 CHRONIC PAIN OF RIGHT KNEE: ICD-10-CM

## 2024-06-27 DIAGNOSIS — I10 PRIMARY HYPERTENSION: Primary | ICD-10-CM

## 2024-06-27 DIAGNOSIS — I48.20 CHRONIC ATRIAL FIBRILLATION (MULTI): ICD-10-CM

## 2024-06-27 DIAGNOSIS — E78.49 OTHER HYPERLIPIDEMIA: ICD-10-CM

## 2024-06-27 DIAGNOSIS — N18.31 STAGE 3A CHRONIC KIDNEY DISEASE (MULTI): ICD-10-CM

## 2024-06-27 PROCEDURE — 3075F SYST BP GE 130 - 139MM HG: CPT | Performed by: INTERNAL MEDICINE

## 2024-06-27 PROCEDURE — 1158F ADVNC CARE PLAN TLK DOCD: CPT | Performed by: INTERNAL MEDICINE

## 2024-06-27 PROCEDURE — 3078F DIAST BP <80 MM HG: CPT | Performed by: INTERNAL MEDICINE

## 2024-06-27 PROCEDURE — 1125F AMNT PAIN NOTED PAIN PRSNT: CPT | Performed by: INTERNAL MEDICINE

## 2024-06-27 PROCEDURE — 1123F ACP DISCUSS/DSCN MKR DOCD: CPT | Performed by: INTERNAL MEDICINE

## 2024-06-27 PROCEDURE — 99213 OFFICE O/P EST LOW 20 MIN: CPT | Performed by: INTERNAL MEDICINE

## 2024-06-27 RX ORDER — PANTOPRAZOLE SODIUM 40 MG/1
TABLET, DELAYED RELEASE ORAL
Qty: 90 TABLET | Refills: 2 | Status: SHIPPED | OUTPATIENT
Start: 2024-06-27

## 2024-06-27 RX ORDER — FAMOTIDINE 40 MG/1
40 TABLET, FILM COATED ORAL DAILY
Qty: 90 TABLET | Refills: 2 | Status: SHIPPED | OUTPATIENT
Start: 2024-06-27

## 2024-06-27 ASSESSMENT — ENCOUNTER SYMPTOMS
UNEXPECTED WEIGHT CHANGE: 0
APPETITE CHANGE: 0
ACTIVITY CHANGE: 1
PALPITATIONS: 0
ABDOMINAL PAIN: 0
COUGH: 0
DYSURIA: 0
NECK PAIN: 0
SORE THROAT: 1
LIGHT-HEADEDNESS: 0
DIZZINESS: 0
ARTHRALGIAS: 1
FREQUENCY: 0
SLEEP DISTURBANCE: 0
SHORTNESS OF BREATH: 0
HEADACHES: 0
WHEEZING: 0
DIFFICULTY URINATING: 0
BACK PAIN: 0
NERVOUS/ANXIOUS: 0
CHILLS: 0
WEAKNESS: 0
FLANK PAIN: 0
BLOOD IN STOOL: 0
DECREASED CONCENTRATION: 0
CHEST TIGHTNESS: 0

## 2024-06-27 ASSESSMENT — PAIN SCALES - GENERAL: PAINLEVEL: 6

## 2024-06-27 NOTE — PATIENT INSTRUCTIONS
Use Bengay patch for knee pain and start water therapy/physical therapy. Begin magnesium and omega-3 supplements. Consider gummy forms if preferred.  Lower simvastatin to half- 10 mg.   Follow in 4 mths

## 2024-06-27 NOTE — PROGRESS NOTES
Subjective   Patient ID: Magali Cardenas is a 78 y.o. female who presents for Follow-up.    HPI-patient is seen today for complaints of pain in her right hip and right knee. His pain is getting worse, difficulty ambulating and difficulty getting out of the house due to pain and mobility. She is so orthopedics who recommended physical therapy and water therapy.  She complains of frequent heartburn and takes pantoprazole and famotidine.  No injuries or falls. She uses walker. She lives with her .  She has hypertension, hyperlipidemia, atrial fibrillation-takes all medications    Review of Systems   Constitutional:  Positive for activity change. Negative for appetite change, chills and unexpected weight change.   HENT:  Positive for postnasal drip and sore throat. Negative for congestion.         Thick mucous coming to back of throat. Endorses erythema of tongue.   Eyes:  Negative for visual disturbance.   Respiratory:  Negative for cough, chest tightness, shortness of breath and wheezing.    Cardiovascular:  Negative for chest pain, palpitations and leg swelling.   Gastrointestinal:  Negative for abdominal pain and blood in stool.   Endocrine: Negative for cold intolerance and heat intolerance.   Genitourinary:  Negative for difficulty urinating, dysuria, flank pain and frequency.   Musculoskeletal:  Positive for arthralgias and gait problem. Negative for back pain and neck pain.        Painful right hip and knee. Difficulty walking (right knee cannot bear weight)   Skin:  Negative for rash.   Allergic/Immunologic: Negative for environmental allergies and food allergies.   Neurological:  Negative for dizziness, weakness, light-headedness and headaches.   Psychiatric/Behavioral:  Negative for decreased concentration and sleep disturbance. The patient is not nervous/anxious.        Objective   /73   Pulse 62   Temp 36.3 °C (97.4 °F)   Wt 82.7 kg (182 lb 6.4 oz)   BMI 31.76 kg/m²     Physical  Exam  Vitals reviewed.   Constitutional:       General: She is not in acute distress.     Appearance: Normal appearance.   HENT:      Head: Normocephalic and atraumatic.      Mouth/Throat:      Mouth: Mucous membranes are moist.   Eyes:      Extraocular Movements: Extraocular movements intact.      Conjunctiva/sclera: Conjunctivae normal.      Pupils: Pupils are equal, round, and reactive to light.   Cardiovascular:      Rate and Rhythm: Normal rate and regular rhythm.      Pulses: Normal pulses.   Pulmonary:      Effort: Pulmonary effort is normal. No respiratory distress.      Breath sounds: Normal breath sounds.   Abdominal:      General: Bowel sounds are normal. There is no distension.      Tenderness: There is no abdominal tenderness.   Musculoskeletal:         General: Swelling present. No tenderness. Normal range of motion.      Cervical back: Normal range of motion.      Right lower leg: Edema present.      Left lower leg: Edema present.      Comments: RLE > LLE and brace in place   Skin:     General: Skin is warm.   Neurological:      General: No focal deficit present.      Mental Status: She is alert.      Coordination: Coordination normal.      Gait: Gait normal.   Psychiatric:         Mood and Affect: Mood normal.         Behavior: Behavior normal.         Thought Content: Thought content normal.         Judgment: Judgment normal.         Assessment/Plan   Diagnoses and all orders for this visit:  Primary hypertension  Comments:  Stable  continue metoprolol, triamterene/HCTZ  Orders:  -     Comprehensive Metabolic Panel; Future  Other hyperlipidemia  Comments:  Continues simvastatin  Orders:  -     Lipid Panel; Future  Chronic atrial fibrillation (Multi)  Comments:  Continue Eliquis 5 mg BID  Stage 3a chronic kidney disease (Multi)  Comments:  Renal function is normal  bloodwork ordered  Gastroesophageal reflux disease without esophagitis  Comments:  Continue pantoprazole and famotidine  Orders:  -      pantoprazole (ProtoNix) 40 mg EC tablet; TAKE 1 TABLET BY MOUTH EVERY DAY 30 TO 60 MINUTES BEFORE  BREAKFAST  -     famotidine (Pepcid) 40 mg tablet; Take 1 tablet (40 mg) by mouth once daily.  Chronic pain of right knee  Comments:  Continue brace, topical analgesics and water therapy for physical therapy    Follow-up in four months

## 2024-09-04 DIAGNOSIS — E78.5 HYPERLIPIDEMIA, UNSPECIFIED: ICD-10-CM

## 2024-09-04 DIAGNOSIS — E78.49 OTHER HYPERLIPIDEMIA: ICD-10-CM

## 2024-09-05 RX ORDER — SIMVASTATIN 20 MG/1
20 TABLET, FILM COATED ORAL EVERY EVENING
Qty: 90 TABLET | Refills: 2 | Status: SHIPPED | OUTPATIENT
Start: 2024-09-05

## 2024-09-23 DIAGNOSIS — I48.91 ATRIAL FIBRILLATION, UNSPECIFIED TYPE (MULTI): ICD-10-CM

## 2024-09-24 RX ORDER — METOPROLOL SUCCINATE 25 MG/1
25 TABLET, EXTENDED RELEASE ORAL DAILY
Qty: 90 TABLET | Refills: 3 | Status: SHIPPED | OUTPATIENT
Start: 2024-09-24

## 2024-10-15 ENCOUNTER — LAB (OUTPATIENT)
Dept: LAB | Facility: LAB | Age: 79
End: 2024-10-15
Payer: COMMERCIAL

## 2024-10-15 ENCOUNTER — APPOINTMENT (OUTPATIENT)
Dept: PRIMARY CARE | Facility: CLINIC | Age: 79
End: 2024-10-15
Payer: MEDICARE

## 2024-10-15 DIAGNOSIS — I10 PRIMARY HYPERTENSION: ICD-10-CM

## 2024-10-15 DIAGNOSIS — E78.49 OTHER HYPERLIPIDEMIA: ICD-10-CM

## 2024-10-15 LAB
ALBUMIN SERPL BCP-MCNC: 4.5 G/DL (ref 3.4–5)
ALP SERPL-CCNC: 79 U/L (ref 33–136)
ALT SERPL W P-5'-P-CCNC: 12 U/L (ref 7–45)
ANION GAP SERPL CALC-SCNC: 15 MMOL/L (ref 10–20)
AST SERPL W P-5'-P-CCNC: 19 U/L (ref 9–39)
BILIRUB SERPL-MCNC: 0.7 MG/DL (ref 0–1.2)
BUN SERPL-MCNC: 21 MG/DL (ref 6–23)
CALCIUM SERPL-MCNC: 10 MG/DL (ref 8.6–10.6)
CHLORIDE SERPL-SCNC: 98 MMOL/L (ref 98–107)
CHOLEST SERPL-MCNC: 218 MG/DL (ref 0–199)
CHOLESTEROL/HDL RATIO: 2.4
CO2 SERPL-SCNC: 27 MMOL/L (ref 21–32)
CREAT SERPL-MCNC: 0.74 MG/DL (ref 0.5–1.05)
EGFRCR SERPLBLD CKD-EPI 2021: 82 ML/MIN/1.73M*2
GLUCOSE SERPL-MCNC: 85 MG/DL (ref 74–99)
HDLC SERPL-MCNC: 89.9 MG/DL
LDLC SERPL CALC-MCNC: 109 MG/DL
NON HDL CHOLESTEROL: 128 MG/DL (ref 0–149)
POTASSIUM SERPL-SCNC: 4.6 MMOL/L (ref 3.5–5.3)
PROT SERPL-MCNC: 6.9 G/DL (ref 6.4–8.2)
SODIUM SERPL-SCNC: 135 MMOL/L (ref 136–145)
TRIGL SERPL-MCNC: 97 MG/DL (ref 0–149)
VLDL: 19 MG/DL (ref 0–40)

## 2024-10-15 PROCEDURE — 80061 LIPID PANEL: CPT

## 2024-10-15 PROCEDURE — 80053 COMPREHEN METABOLIC PANEL: CPT

## 2024-10-15 PROCEDURE — 36415 COLL VENOUS BLD VENIPUNCTURE: CPT

## 2024-11-19 ENCOUNTER — APPOINTMENT (OUTPATIENT)
Dept: PRIMARY CARE | Facility: CLINIC | Age: 79
End: 2024-11-19
Payer: MEDICARE

## 2024-11-19 VITALS
BODY MASS INDEX: 30.75 KG/M2 | HEART RATE: 72 BPM | SYSTOLIC BLOOD PRESSURE: 130 MMHG | WEIGHT: 176.6 LBS | DIASTOLIC BLOOD PRESSURE: 84 MMHG

## 2024-11-19 DIAGNOSIS — G89.29 CHRONIC PAIN OF RIGHT KNEE: ICD-10-CM

## 2024-11-19 DIAGNOSIS — K21.9 GASTROESOPHAGEAL REFLUX DISEASE WITHOUT ESOPHAGITIS: ICD-10-CM

## 2024-11-19 DIAGNOSIS — I48.20 CHRONIC ATRIAL FIBRILLATION (MULTI): ICD-10-CM

## 2024-11-19 DIAGNOSIS — M25.561 CHRONIC PAIN OF RIGHT KNEE: ICD-10-CM

## 2024-11-19 DIAGNOSIS — M79.604 RIGHT LEG PAIN: Primary | ICD-10-CM

## 2024-11-19 DIAGNOSIS — M16.11 PRIMARY OSTEOARTHRITIS OF RIGHT HIP: ICD-10-CM

## 2024-11-19 DIAGNOSIS — I48.91 ATRIAL FIBRILLATION, UNSPECIFIED TYPE (MULTI): ICD-10-CM

## 2024-11-19 DIAGNOSIS — Z23 INFLUENZA VACCINATION ADMINISTERED AT CURRENT VISIT: ICD-10-CM

## 2024-11-19 PROCEDURE — G0008 ADMIN INFLUENZA VIRUS VAC: HCPCS | Performed by: INTERNAL MEDICINE

## 2024-11-19 PROCEDURE — 3075F SYST BP GE 130 - 139MM HG: CPT | Performed by: INTERNAL MEDICINE

## 2024-11-19 PROCEDURE — 90662 IIV NO PRSV INCREASED AG IM: CPT | Performed by: INTERNAL MEDICINE

## 2024-11-19 PROCEDURE — 99213 OFFICE O/P EST LOW 20 MIN: CPT | Performed by: INTERNAL MEDICINE

## 2024-11-19 PROCEDURE — 3079F DIAST BP 80-89 MM HG: CPT | Performed by: INTERNAL MEDICINE

## 2024-11-19 PROCEDURE — 1036F TOBACCO NON-USER: CPT | Performed by: INTERNAL MEDICINE

## 2024-11-19 PROCEDURE — 1123F ACP DISCUSS/DSCN MKR DOCD: CPT | Performed by: INTERNAL MEDICINE

## 2024-11-19 PROCEDURE — G2211 COMPLEX E/M VISIT ADD ON: HCPCS | Performed by: INTERNAL MEDICINE

## 2024-11-19 RX ORDER — APIXABAN 5 MG/1
2.5 TABLET, FILM COATED ORAL 2 TIMES DAILY
Qty: 180 TABLET | Refills: 1 | Status: SHIPPED | OUTPATIENT
Start: 2024-11-19 | End: 2024-11-19

## 2024-11-19 RX ORDER — APIXABAN 5 MG/1
2.5 TABLET, FILM COATED ORAL 2 TIMES DAILY
Qty: 180 TABLET | Refills: 1 | Status: SHIPPED | OUTPATIENT
Start: 2024-11-19

## 2024-11-19 ASSESSMENT — ENCOUNTER SYMPTOMS
NERVOUS/ANXIOUS: 0
SORE THROAT: 0
DYSURIA: 0
SHORTNESS OF BREATH: 0
FEVER: 0
CHILLS: 0
PALPITATIONS: 0
BLOOD IN STOOL: 0
DIZZINESS: 0
CONFUSION: 0
FREQUENCY: 0
ABDOMINAL PAIN: 0
FATIGUE: 0
COUGH: 0
WEAKNESS: 1
MYALGIAS: 0
DIARRHEA: 0
ARTHRALGIAS: 1
CONSTIPATION: 0
SINUS PAIN: 0
HEADACHES: 0
NECK PAIN: 0
BACK PAIN: 0

## 2024-11-19 NOTE — PROGRESS NOTES
Subjective   Patient ID: Magali Cardenas is a 79 y.o. female who presents for Follow-up and Leg Pain (Diff walking).    HPI Ms. Cardenas is a 79-year-old female who is seen today for routine follow-up.  Medical history significant for hypertension, atrial fibrillation, acid reflux, severe right leg pain due to osteoarthritis in the hip, difficulty ambulating.  She has been using walker for ambulation.  She is seeing orthopedics at [facility] for possible hip replacement.  She needs to get physical therapy for improving her strength in order to go for her surgery.  She states that her acid reflux is getting worse.  No injuries or falls.    Review of Systems   Constitutional:  Negative for chills, fatigue and fever.   HENT:  Negative for congestion, sinus pain and sore throat.    Respiratory:  Negative for cough and shortness of breath.    Cardiovascular:  Negative for chest pain, palpitations and leg swelling.   Gastrointestinal:  Negative for abdominal pain, blood in stool, constipation and diarrhea.   Genitourinary:  Negative for dysuria and frequency.   Musculoskeletal:  Positive for arthralgias and gait problem. Negative for back pain, myalgias and neck pain.        Right leg and knee pain  Diff walking   Neurological:  Positive for weakness. Negative for dizziness and headaches.   Psychiatric/Behavioral:  Negative for confusion. The patient is not nervous/anxious.        Objective   /84 (BP Location: Right arm, Patient Position: Sitting)   Pulse 72   Wt 80.1 kg (176 lb 9.6 oz)   BMI 30.75 kg/m²     Physical Exam  Vitals reviewed.   Constitutional:       General: She is not in acute distress.     Appearance: Normal appearance.   HENT:      Head: Normocephalic and atraumatic.      Mouth/Throat:      Mouth: Mucous membranes are moist.   Cardiovascular:      Rate and Rhythm: Normal rate and regular rhythm.      Pulses: Normal pulses.   Pulmonary:      Effort: Pulmonary effort is normal. No respiratory distress.       Breath sounds: Normal breath sounds.   Abdominal:      General: Bowel sounds are normal. There is no distension.      Tenderness: There is no abdominal tenderness.   Musculoskeletal:         General: Swelling present. No tenderness. Normal range of motion.      Right lower leg: Edema present.   Skin:     General: Skin is warm.   Neurological:      General: No focal deficit present.      Mental Status: She is alert.      Motor: Weakness present.      Coordination: Coordination normal.      Gait: Gait abnormal.   Psychiatric:         Mood and Affect: Mood normal.         Behavior: Behavior normal.         Assessment/Plan   Diagnoses and all orders for this visit:  Right leg pain  Comments:  Due to severe right hip osteoarthritis  Follow-up with orthopedics  Chronic pain of right knee  Comments:  Status post right knee replacement  Gastroesophageal reflux disease without esophagitis  Comments:  Continue pantoprazole and Pepcid  Primary osteoarthritis of right hip  Comments:  Follow[facility]  Chronic atrial fibrillation (Multi)  Comments:  Continue Eliquis 2.5 twice daily  Atrial fibrillation, unspecified type (Multi)  -     Eliquis 5 mg tablet; Take 0.5 tablets (2.5 mg) by mouth 2 times a day.  Influenza vaccination administered at current visit  Comments:  High-dose influenza vaccine administered  Orders:  -     Flu vaccine, trivalent, preservative free, HIGH-DOSE, age 65y+ (Fluzone)    Follow-up in March or sooner if needed

## 2025-01-03 ENCOUNTER — APPOINTMENT (OUTPATIENT)
Dept: RADIOLOGY | Facility: HOSPITAL | Age: 80
End: 2025-01-03
Payer: MEDICARE

## 2025-01-03 ENCOUNTER — HOSPITAL ENCOUNTER (EMERGENCY)
Facility: HOSPITAL | Age: 80
Discharge: HOME | End: 2025-01-03
Attending: EMERGENCY MEDICINE
Payer: MEDICARE

## 2025-01-03 ENCOUNTER — APPOINTMENT (OUTPATIENT)
Dept: CARDIOLOGY | Facility: HOSPITAL | Age: 80
End: 2025-01-03
Payer: MEDICARE

## 2025-01-03 VITALS
DIASTOLIC BLOOD PRESSURE: 83 MMHG | HEART RATE: 64 BPM | TEMPERATURE: 97.2 F | SYSTOLIC BLOOD PRESSURE: 156 MMHG | OXYGEN SATURATION: 100 % | RESPIRATION RATE: 18 BRPM

## 2025-01-03 DIAGNOSIS — S00.93XA TRAUMATIC CEPHALOHEMATOMA, INITIAL ENCOUNTER: ICD-10-CM

## 2025-01-03 DIAGNOSIS — S01.01XA LACERATION OF SCALP, INITIAL ENCOUNTER: ICD-10-CM
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
## 2025-01-03 LAB
ALBUMIN SERPL BCP-MCNC: 3.9 G/DL (ref 3.4–5)
ALP SERPL-CCNC: 69 U/L (ref 33–136)
ALT SERPL W P-5'-P-CCNC: 13 U/L (ref 7–45)
ANION GAP BLDV CALCULATED.4IONS-SCNC: 8 MMOL/L (ref 10–25)
ANION GAP SERPL CALC-SCNC: 13 MMOL/L (ref 10–20)
AST SERPL W P-5'-P-CCNC: 19 U/L (ref 9–39)
ATRIAL RATE: 60 BPM
BASE EXCESS BLDV CALC-SCNC: 0.8 MMOL/L (ref -2–3)
BASOPHILS # BLD AUTO: 0.06 X10*3/UL (ref 0–0.1)
BASOPHILS NFR BLD AUTO: 0.9 %
BILIRUB SERPL-MCNC: 0.6 MG/DL (ref 0–1.2)
BODY TEMPERATURE: 37 DEGREES CELSIUS
BUN SERPL-MCNC: 21 MG/DL (ref 6–23)
CA-I BLDV-SCNC: 1.26 MMOL/L (ref 1.1–1.33)
CALCIUM SERPL-MCNC: 9.8 MG/DL (ref 8.6–10.3)
CHLORIDE BLDV-SCNC: 106 MMOL/L (ref 98–107)
CHLORIDE SERPL-SCNC: 106 MMOL/L (ref 98–107)
CO2 SERPL-SCNC: 25 MMOL/L (ref 21–32)
CREAT SERPL-MCNC: 0.94 MG/DL (ref 0.5–1.05)
EGFRCR SERPLBLD CKD-EPI 2021: 61 ML/MIN/1.73M*2
EOSINOPHIL # BLD AUTO: 0.15 X10*3/UL (ref 0–0.4)
EOSINOPHIL NFR BLD AUTO: 2.1 %
ERYTHROCYTE [DISTWIDTH] IN BLOOD BY AUTOMATED COUNT: 12.6 % (ref 11.5–14.5)
GLUCOSE BLDV-MCNC: 98 MG/DL (ref 74–99)
GLUCOSE SERPL-MCNC: 96 MG/DL (ref 74–99)
HCO3 BLDV-SCNC: 26 MMOL/L (ref 22–26)
HCT VFR BLD AUTO: 39.7 % (ref 36–46)
HCT VFR BLD EST: 40 % (ref 36–46)
HGB BLD-MCNC: 13 G/DL (ref 12–16)
HGB BLDV-MCNC: 13.4 G/DL (ref 12–16)
IMM GRANULOCYTES # BLD AUTO: 0.04 X10*3/UL (ref 0–0.5)
IMM GRANULOCYTES NFR BLD AUTO: 0.6 % (ref 0–0.9)
INHALED O2 CONCENTRATION: 100 %
INR PPP: 1.3 (ref 0.9–1.1)
LACTATE BLDV-SCNC: 1.3 MMOL/L (ref 0.4–2)
LYMPHOCYTES # BLD AUTO: 0.86 X10*3/UL (ref 0.8–3)
LYMPHOCYTES NFR BLD AUTO: 12.3 %
MCH RBC QN AUTO: 31.3 PG (ref 26–34)
MCHC RBC AUTO-ENTMCNC: 32.7 G/DL (ref 32–36)
MCV RBC AUTO: 96 FL (ref 80–100)
MONOCYTES # BLD AUTO: 0.42 X10*3/UL (ref 0.05–0.8)
MONOCYTES NFR BLD AUTO: 6 %
NEUTROPHILS # BLD AUTO: 5.45 X10*3/UL (ref 1.6–5.5)
NEUTROPHILS NFR BLD AUTO: 78.1 %
NRBC BLD-RTO: 0 /100 WBCS (ref 0–0)
OXYHGB MFR BLDV: 78.6 % (ref 45–75)
P OFFSET: 201 MS
P ONSET: 143 MS
PCO2 BLDV: 43 MM HG (ref 41–51)
PH BLDV: 7.39 PH (ref 7.33–7.43)
PLATELET # BLD AUTO: 256 X10*3/UL (ref 150–450)
PO2 BLDV: 47 MM HG (ref 35–45)
POTASSIUM BLDV-SCNC: 4.3 MMOL/L (ref 3.5–5.3)
POTASSIUM SERPL-SCNC: 4.1 MMOL/L (ref 3.5–5.3)
PR INTERVAL: 170 MS
PROT SERPL-MCNC: 6.5 G/DL (ref 6.4–8.2)
PROTHROMBIN TIME: 14.7 SECONDS (ref 9.8–12.8)
Q ONSET: 228 MS
QRS COUNT: 10 BEATS
QRS DURATION: 76 MS
QT INTERVAL: 426 MS
QTC CALCULATION(BAZETT): 426 MS
QTC FREDERICIA: 426 MS
R AXIS: 129 DEGREES
RBC # BLD AUTO: 4.15 X10*6/UL (ref 4–5.2)
SAO2 % BLDV: 80 % (ref 45–75)
SODIUM BLDV-SCNC: 136 MMOL/L (ref 136–145)
SODIUM SERPL-SCNC: 140 MMOL/L (ref 136–145)
T AXIS: 120 DEGREES
T OFFSET: 441 MS
VENTRICULAR RATE: 60 BPM
WBC # BLD AUTO: 7 X10*3/UL (ref 4.4–11.3)

## 2025-01-03 PROCEDURE — 93005 ELECTROCARDIOGRAM TRACING: CPT | Mod: 59

## 2025-01-03 PROCEDURE — 99285 EMERGENCY DEPT VISIT HI MDM: CPT | Mod: 25 | Performed by: EMERGENCY MEDICINE

## 2025-01-03 PROCEDURE — 2500000001 HC RX 250 WO HCPCS SELF ADMINISTERED DRUGS (ALT 637 FOR MEDICARE OP)

## 2025-01-03 PROCEDURE — 2500000004 HC RX 250 GENERAL PHARMACY W/ HCPCS (ALT 636 FOR OP/ED)

## 2025-01-03 PROCEDURE — 70450 CT HEAD/BRAIN W/O DYE: CPT

## 2025-01-03 PROCEDURE — 71045 X-RAY EXAM CHEST 1 VIEW: CPT

## 2025-01-03 PROCEDURE — 96372 THER/PROPH/DIAG INJ SC/IM: CPT

## 2025-01-03 PROCEDURE — 72125 CT NECK SPINE W/O DYE: CPT | Performed by: RADIOLOGY

## 2025-01-03 PROCEDURE — 96374 THER/PROPH/DIAG INJ IV PUSH: CPT | Mod: 59

## 2025-01-03 PROCEDURE — 71045 X-RAY EXAM CHEST 1 VIEW: CPT | Performed by: RADIOLOGY

## 2025-01-03 PROCEDURE — 70450 CT HEAD/BRAIN W/O DYE: CPT | Performed by: RADIOLOGY

## 2025-01-03 PROCEDURE — 85025 COMPLETE CBC W/AUTO DIFF WBC: CPT

## 2025-01-03 PROCEDURE — 85610 PROTHROMBIN TIME: CPT

## 2025-01-03 PROCEDURE — 72125 CT NECK SPINE W/O DYE: CPT

## 2025-01-03 PROCEDURE — 12002 RPR S/N/AX/GEN/TRNK2.6-7.5CM: CPT

## 2025-01-03 PROCEDURE — 73502 X-RAY EXAM HIP UNI 2-3 VIEWS: CPT | Mod: RIGHT SIDE | Performed by: RADIOLOGY

## 2025-01-03 PROCEDURE — 36415 COLL VENOUS BLD VENIPUNCTURE: CPT

## 2025-01-03 PROCEDURE — 84132 ASSAY OF SERUM POTASSIUM: CPT

## 2025-01-03 PROCEDURE — 84132 ASSAY OF SERUM POTASSIUM: CPT | Mod: 59

## 2025-01-03 PROCEDURE — 73502 X-RAY EXAM HIP UNI 2-3 VIEWS: CPT | Mod: RT

## 2025-01-03 RX ORDER — KETOROLAC TROMETHAMINE 30 MG/ML
15 INJECTION, SOLUTION INTRAMUSCULAR; INTRAVENOUS ONCE
Status: COMPLETED | OUTPATIENT
Start: 2025-01-03 | End: 2025-01-03

## 2025-01-03 RX ORDER — METHOCARBAMOL 100 MG/ML
1000 INJECTION, SOLUTION INTRAMUSCULAR; INTRAVENOUS ONCE
Status: COMPLETED | OUTPATIENT
Start: 2025-01-03 | End: 2025-01-03

## 2025-01-03 RX ORDER — ACETAMINOPHEN 160 MG/5ML
650 SUSPENSION ORAL ONCE
Status: COMPLETED | OUTPATIENT
Start: 2025-01-03 | End: 2025-01-03

## 2025-01-03 RX ORDER — BACITRACIN ZINC 500 UNIT/G
OINTMENT (GRAM) TOPICAL ONCE
Status: COMPLETED | OUTPATIENT
Start: 2025-01-03 | End: 2025-01-03

## 2025-01-03 RX ORDER — ACETAMINOPHEN 325 MG/1
975 TABLET ORAL ONCE
Status: DISCONTINUED | OUTPATIENT
Start: 2025-01-03 | End: 2025-01-03

## 2025-01-03 RX ORDER — LIDOCAINE HYDROCHLORIDE AND EPINEPHRINE 10; 10 UG/ML; MG/ML
10 INJECTION, SOLUTION INFILTRATION; PERINEURAL ONCE
Status: COMPLETED | OUTPATIENT
Start: 2025-01-03 | End: 2025-01-03

## 2025-01-03 RX ADMIN — BACITRACIN ZINC: 500 OINTMENT TOPICAL at 15:29

## 2025-01-03 RX ADMIN — KETOROLAC TROMETHAMINE 15 MG: 30 INJECTION, SOLUTION INTRAMUSCULAR at 15:29

## 2025-01-03 RX ADMIN — ACETAMINOPHEN 650 MG: 160 SUSPENSION ORAL at 13:36

## 2025-01-03 RX ADMIN — METHOCARBAMOL 1000 MG: 1000 INJECTION, SOLUTION INTRAMUSCULAR; INTRAVENOUS at 15:29

## 2025-01-03 RX ADMIN — LIDOCAINE HYDROCHLORIDE,EPINEPHRINE BITARTRATE 10 ML: 10; .01 INJECTION, SOLUTION INFILTRATION; PERINEURAL at 15:29

## 2025-01-03 RX ADMIN — Medication 1 APPLICATION: at 13:37

## 2025-01-03 ASSESSMENT — PAIN SCALES - GENERAL
PAINLEVEL_OUTOF10: 0 - NO PAIN
PAINLEVEL_OUTOF10: 2
PAINLEVEL_OUTOF10: 3
PAINLEVEL_OUTOF10: 3

## 2025-01-03 ASSESSMENT — PAIN - FUNCTIONAL ASSESSMENT
PAIN_FUNCTIONAL_ASSESSMENT: 0-10

## 2025-01-03 NOTE — PROGRESS NOTES
Spiritual Care Visit  Spiritual Care Request    Reason for Visit:  Routine Visit: Introduction  Continue Visiting: Yes  Crisis Visit: Other (Comment)   Request Received From:  Referral From: Verbal  Focus of Care:  Visited With: Patient and family together   Care Provided for Crisis Visit: Responded to the crisis/alert (Rapid response)   Refer to :  Referral To:    Spiritual Care Assessment    Spiritual Assessment:   was with the pt at the time of the rapid.  Care Provided:  There was a consultation for the purpose of reducing the 's anxiety.  Sense of Community and or Gnosticism Affiliation:  Sikhism    Addressed Needs/Concerns and/or Mauri Through:  After marriage of 57 years the close bond was evident.  Outcome:  The pt stated that she appreciated the care that her  received while waiting for her return from testing.  Spiritual Care Annotation    Annotation:  There will be another visit scheduled if time permits.     Chaplain Catherine Mahajan

## 2025-01-03 NOTE — DISCHARGE INSTRUCTIONS
Return to ER for worsening headache, nausea/vomiting, dizziness, or confusion.  Follow up with your primary care provider or neurologist (or return to the ER) for any headache, nausea/vomiting or dizziness that does not improve over the course of 1 week. Return to activity as tolerated but if headaches develop, decrease the amount of activity and stimulation that you’re doing.  You may need to avoid TV, computers, smartphones and other electronic stimuli for several days. Avoid any situation that may put you at high risk for repeat head trauma during the recovery period.     Please follow-up with your primary care doctor or plastic surgery in the next week for a wound evaluation.  Please return if you develop any redness swelling pus or fevers    --    [facility]  Phone: [phone]

## 2025-01-03 NOTE — NURSING NOTE
Responded to Rapid Response outside the main lobby, for this patient s/p fall, with a posterior head laceration on Eliquis. Pt was alert laying supine with bystanders caring for her on arrival. C-Spine precautions placed. Pt said she was here for an outpatient appointment to have her shoulder injected. She said when she was trying to enter the building, the door hit her and knocked her off balance. Pt said she fell straight back and hit her head onto the concrete. Pt c/o pain to her head, R knee, and R hip location. Denied any other symptoms or complaints. In process of obtaining cart from Emergency Department, gauze pressure dressing applied to posterior head wound. Pt log rolled onto transfer device then lifted to ED stretcher. Pt brought to ED room 1 and examined by Dr. Bradley. Trauma alert activated and pt prepped to go to CT. Trauma alert then canceled by Dr. Miller, and pt was transferred onto ED Cart and placed back in room. Care turned over to ED staff.

## 2025-01-03 NOTE — ED PROCEDURE NOTE
Procedure  Laceration Repair    Performed by: Anna Bradley DO  Authorized by: Neil Sotelo MD    Consent:     Consent obtained:  Verbal    Consent given by:  Patient  Anesthesia:     Anesthesia method:  Topical application and local infiltration    Topical anesthetic:  LET (incomplete anesthesia)    Local anesthetic:  Lidocaine 1% WITH epi (9cc)  Laceration details:     Location:  Scalp    Scalp location:  Occipital    Length (cm):  5 (stellate and tissue avulsion)    Depth (mm):  3  Pre-procedure details:     Preparation:  Imaging obtained to evaluate for foreign bodies  Exploration:     Hemostasis achieved with:  Epinephrine and direct pressure    Wound exploration: entire depth of wound visualized      Wound extent: areolar tissue violated      Wound extent: no foreign bodies/material noted and no underlying fracture noted      Contaminated: no    Treatment:     Area cleansed with:  Chlorhexidine    Irrigation solution:  Sterile water    Irrigation volume:  1000    Irrigation method:  Syringe    Undermining:  Minimal    Scar revision: no    Skin repair:     Repair method:  Sutures and staples    Suture size:  3-0    Wound skin closure material used: ethilon.    Suture technique:  Simple interrupted and vertical mattress    Number of sutures:  5    Number of staples:  9  Approximation:     Approximation:  Loose (due to tissue edema, underlying hematoma, and avulsion of skin, loose approx at wound center)  Repair type:     Repair type:  Complex  Post-procedure details:     Dressing:  Antibiotic ointment and bulky dressing    Procedure completion:  Tolerated well, no immediate complications  Comments:      Anna Bradley DO  PGY-3 Emergency Medicine                 Anna Bradley DO  Resident  01/03/25 [id number]

## 2025-01-03 NOTE — ED PROVIDER NOTES
History of Present Illness     History provided by: Patient, Family Member, and EMS  Limitations to History: None  External Records Reviewed with Brief Summary:  Outpatient records from PCP for past medical history, vaccination history med list    HPI:  Magali Cardenas is a 79 y.o. female who presents as a fall on Eliquis.  She was walking into [facility] for an appointment when she was hit by the door falling backwards and hitting the back of her head on the concrete.  She has laceration bleeding to the back of her head.  She did take her Eliquis today.  She has no numbness tingling or weakness.  She does endorse a headache.  No chest pain abdominal pain back pain.  She has chronic pain in her right hip and knees and left shoulder none of which other than her right hip are worse than usual.  He is in her normal state of health prior to this.  No fevers chills or sick contacts.    Physical Exam   Triage vitals:  T 36.2 °C (97.2 °F)  HR 64  /83  RR 18  O2 100 %      General: Awake, alert, in no acute distress  Eyes: Gaze conjugate.  No scleral icterus or injection  HENT: Normo-cephalic, cephalohematoma with large stellate laceration to the posterior occiput. No stridor, Aspen in place  CV: Regular rate, irregular rhythm. Radial pulses 2+ bilaterally  Resp: Breathing non-labored, speaking in full sentences.  Clear to auscultation bilaterally  GI: Soft, non-distended, non-tender. No rebound or guarding.  : Deferred  MSK/Extremities: No gross bony deformities. Moving all extremities, pain on palpation of the right lateral hip, no pain on palpation of the CT or L-spine no step-offs or deformities.  No pain on palpation of the bilateral upper extremities.  Minimal pain with full range of motion of the bilateral knees.  Skin: Warm. Appropriate color, complex stellate laceration to the posterior occiput with oozing  Neuro: Alert. Oriented. Face symmetric. Speech is fluent.  Gross strength and  sensation intact in b/l UE and LEs  Psych: Appropriate mood and affect      Medical Decision Making & ED Course   Medical Decision Makin y.o. female presents after a fall on Eliquis.  Patient has large laceration bleeding on back of head.  She is hemodynamically stable.  She is placed in Leonard collar prior to transfer to ED from parking lot.  She was warmed with warm blankets and sent to CT for imaging of head and neck which did not show underlying fracture or intracranial hemorrhage.  Obtained chest x-ray and pelvis with right hip with no underlying fracture or acute process.  Patient's labs grossly unremarkable no acid-base derangement no elevation in lactate no renal hepatic or electrolyte abnormalities.  Mildly deranged PT/INR in the setting of Eliquis use.  Patient received Tylenol Toradol and Robaxin for pain management while in the ED and analgesia/hemostasis were attempted with let and lidocaine with epi at her wound.  Patient had extensive wound cleaning and repair performed with stable and sutures.  Patient was given strict return cautions for wound infection recurrent bleeding or head injury.  Patient is agreeable with plan for home-going with close follow-up.  She is given follow-up with plastics for wound evaluation and revision as needed.  Patient is able to ambulate and discharged with a steady gait with family.  ----   Social Determinants of Health which Significantly Impact Care: Difficulty obtaining outpatient follow-up The following actions were taken to address these social determinants: Patient given referral to plastic    EKG Independent Interpretation: EKG interpreted by myself. Please see ED Course and MDM for full interpretation.    Independent Result Review and Interpretation: Results were independently reviewed and interpreted by myself. Please see ED course and MDM for full interpretation.    Chronic conditions affecting the patient's care: As documented in the MDM    The patient  was discussed with the following consultants/services: None EKG was sinus rhythm with rate of 60    Care Considerations: As per [facility]    ED Course:  ED Course as of 01/03/25 1929 Fri Jan 03, 2025   1240 History of GERD atrial fibrillation on Eliquis 2.5 daily and arthritis who presented after a fall on Eliquis. [SC]   1242 Last tetanus shot was 10/7/2021 does not need immunization [SC]   1259 CBC and Auto Differential  No acute leukocytosis anemia thrombocytopenia [SC]   1928 EKG was sinus rhythm rate of 60 bpm intervals within normal limits no acute ST segment elevations or T wave inversions when compared to previous EKG. [SC]      ED Course User Index  [SC] Anna Bradley DO         Diagnoses as of 01/03/25 1929   Fall, initial encounter   Laceration of scalp, initial encounter   Traumatic cephalohematoma, initial encounter     Disposition   As a result of the work-up, the patient was discharged home.  she was informed of her diagnosis and instructed to come back with any concerns or worsening of condition.  she and was agreeable to the plan as discussed above.  she was given the opportunity to ask questions.  All of the patient's questions were answered.    Procedures   Procedures    Patient seen and discussed with ED attending physician.    Anna Bradley DO  Emergency Medicine     Anna Bradley DO  Resident  01/03/25 1929

## 2025-01-09 ENCOUNTER — APPOINTMENT (OUTPATIENT)
Dept: ORTHOPEDIC SURGERY | Facility: CLINIC | Age: 80
End: 2025-01-09
Payer: MEDICARE

## 2025-01-12 LAB
ATRIAL RATE: 60 BPM
P OFFSET: 201 MS
P ONSET: 143 MS
PR INTERVAL: 170 MS
Q ONSET: 228 MS
QRS COUNT: 10 BEATS
QRS DURATION: 76 MS
QT INTERVAL: 426 MS
QTC CALCULATION(BAZETT): 426 MS
QTC FREDERICIA: 426 MS
R AXIS: 129 DEGREES
T AXIS: 120 DEGREES
T OFFSET: 441 MS
VENTRICULAR RATE: 60 BPM

## 2025-01-27 ENCOUNTER — APPOINTMENT (OUTPATIENT)
Dept: PLASTIC SURGERY | Facility: CLINIC | Age: 80
End: 2025-01-27
Payer: MEDICARE

## 2025-01-27 VITALS
RESPIRATION RATE: 18 BRPM | WEIGHT: 176 LBS | DIASTOLIC BLOOD PRESSURE: 48 MMHG | BODY MASS INDEX: 30.65 KG/M2 | SYSTOLIC BLOOD PRESSURE: 139 MMHG | HEART RATE: 58 BPM

## 2025-01-27 DIAGNOSIS — S01.01XA LACERATION OF SCALP, INITIAL ENCOUNTER: ICD-10-CM

## 2025-01-27 DIAGNOSIS — Z48.02 ENCOUNTER FOR REMOVAL OF SUTURES: ICD-10-CM

## 2025-01-27 DIAGNOSIS — Z48.02 ENCOUNTER FOR STAPLE REMOVAL: ICD-10-CM

## 2025-01-27 DIAGNOSIS — Z51.89 ENCOUNTER FOR POST-TRAUMATIC WOUND CHECK: Primary | ICD-10-CM

## 2025-01-27 PROCEDURE — 1159F MED LIST DOCD IN RCRD: CPT | Performed by: PHYSICIAN ASSISTANT

## 2025-01-27 PROCEDURE — 1126F AMNT PAIN NOTED NONE PRSNT: CPT | Performed by: PHYSICIAN ASSISTANT

## 2025-01-27 PROCEDURE — 3078F DIAST BP <80 MM HG: CPT | Performed by: PHYSICIAN ASSISTANT

## 2025-01-27 PROCEDURE — 1123F ACP DISCUSS/DSCN MKR DOCD: CPT | Performed by: PHYSICIAN ASSISTANT

## 2025-01-27 PROCEDURE — 3075F SYST BP GE 130 - 139MM HG: CPT | Performed by: PHYSICIAN ASSISTANT

## 2025-01-27 PROCEDURE — 99204 OFFICE O/P NEW MOD 45 MIN: CPT

## 2025-01-27 ASSESSMENT — PAIN SCALES - GENERAL: PAINLEVEL_OUTOF10: 0-NO PAIN

## 2025-01-27 NOTE — PROGRESS NOTES
Department of Plastic and Reconstructive Surgery  Clinic Visit Note    Patient Name: Magali Cardenas  MRN: [id number]  Date:  01/27/25     HPI    Magali Cardenas is a 79 y.o. female referred to the plastic surgery office for follow up with a traumatic scalp laceration. Patient sustained the laceration 2/2 experiencing a mechanical fall after being struck with a sliding door on 1/3/2025. Her laceration was repaired with x5 non-absorbable sutures and x9 staples per the ED provider staff at [facility]. She denies any concerns with the laceration site. Denies severe pain, drainage, bleeding or dehiscence.     Past Medical History:   Diagnosis Date    Abnormal electrocardiogram (ECG) (EKG) 06/30/2014    Abnormal electrocardiogram    Body mass index (BMI) 35.0-35.9, adult 03/07/2022    BMI 35.0-35.9,adult    Contusion of unspecified part of head, initial encounter 05/15/2014    Contusion of head    Dizziness and giddiness 08/09/2015    Lightheadedness    Encounter for other screening for malignant neoplasm of breast 05/12/2016    Breast cancer screening    Encounter for screening mammogram for malignant neoplasm of breast     Visit for screening mammogram    Nocturia 07/14/2015    Nocturia    Palpitations 08/09/2015    Heart palpitations    Personal history of other (healed) physical injury and trauma 05/15/2014    History of head injury    Personal history of other diseases of the digestive system 11/25/2014    History of constipation    Personal history of urinary (tract) infections 06/24/2013    History of urinary tract infection    Unspecified abnormal findings in urine 06/30/2014    Abnormal urine findings    Unspecified dislocation of right ring finger, initial encounter 05/15/2014    Dislocation of fourth finger, right, closed    Unspecified injury of unspecified wrist, hand and finger(s), initial encounter 05/15/2014    Finger injury     Past Surgical History:   Procedure Laterality Date     CATARACT EXTRACTION  03/20/2013    Cataract Surgery    COLONOSCOPY  06/24/2013    Complete Colonoscopy    TOTAL KNEE ARTHROPLASTY  03/20/2013    Knee Replacement     Allergies   Allergen Reactions    Cardizem [Diltiazem Hcl] Unknown       Current Outpatient Medications:     acetaminophen (Tylenol) 325 mg tablet, Take 1 tablet (325 mg) by mouth every 6 hours if needed for mild pain (1 - 3) or moderate pain (4 - 6)., Disp: , Rfl:     biotin 5 mg capsule, Take 1 Dose by mouth once daily., Disp: , Rfl:     calcium carbonate (Tums) 200 mg calcium chewable tablet, Chew 2 tablets (1,000 mg) 4 times a day as needed for indigestion or heartburn., Disp: , Rfl:     cholecalciferol (Vitamin D-3) 25 MCG (1000 UT) capsule, Take 1 capsule (25 mcg) by mouth once daily., Disp: , Rfl:     Eliquis 5 mg tablet, Take 0.5 tablets (2.5 mg) by mouth 2 times a day., Disp: 180 tablet, Rfl: 1    famotidine (Pepcid) 40 mg tablet, Take 1 tablet (40 mg) by mouth once daily., Disp: 90 tablet, Rfl: 2    ibuprofen (Motrin) capsule, Take 1 capsule (200 mg) by mouth 4 times a day as needed., Disp: , Rfl:     metoprolol succinate XL (Toprol-XL) 25 mg 24 hr tablet, TAKE 1 TABLET BY MOUTH ONCE  DAILY, Disp: 90 tablet, Rfl: 3    mv-calcium-min-iron fm-FA-vitK (Multi For Her) 18 mg iron-600 mcg-80 mcg tablet, Take 1 tablet by mouth once daily., Disp: , Rfl:     pantoprazole (ProtoNix) 40 mg EC tablet, TAKE 1 TABLET BY MOUTH EVERY DAY 30 TO 60 MINUTES BEFORE  BREAKFAST, Disp: 90 tablet, Rfl: 2    simvastatin (Zocor) 20 mg tablet, TAKE 1 TABLET BY MOUTH ONCE  DAILY IN THE EVENING, Disp: 90 tablet, Rfl: 2    triamterene-hydrochlorothiazid (Maxzide-25) 37.5-25 mg tablet, TAKE 1 TABLET BY MOUTH EVERY DAY, Disp: 90 tablet, Rfl: 1   Family History   Problem Relation Name Age of Onset    Colon cancer Mother      Other (SYSTEMIC LUPUS ERYTHEMATOUS) Mother      Other (CARDIAC DISORDER) Father      Stroke Father      Breast cancer Other FAM HX     Colon cancer Other  FAM HX     Diabetes Other G/M     Stomach cancer Other AUNT      Social History     Tobacco Use    Smoking status: Never     Passive exposure: Never    Smokeless tobacco: Never   Vaping Use    Vaping status: Never Used   Substance Use Topics    Alcohol use: Yes     Alcohol/week: 5.0 standard drinks of alcohol     Types: 2 Glasses of wine, 3 Standard drinks or equivalent per week    Drug use: Never       ROS: All 10 systems were reviewed and are unremarkable except for those mentioned in HPI.      Objective   BP (!) 139/48 (BP Location: Left arm, Patient Position: Sitting, BP Cuff Size: Large adult)   Pulse 58   Resp 18   Wt 79.8 kg (176 lb)   BMI 30.65 kg/m²      Physical Exam  Constitutional: A&Ox3, calm and cooperative, NAD.  Eyes: EOMI, clear sclera.  ENMT: Moist mucous membranes, no apparent injuries or lesions.  Head/Neck: Neck supple. Healed laceration to the scalp, approximated with intact non-absorbable sutures and staples. No dat-wound erythema, drainage or dehiscence.    Cardiovascular: Normal rate. 2+ equal pulses of the distal extremities.  Respiratory/Thorax: Breathing comfortably with regular respirations on RA. Good symmetric chest expansion.   Gastrointestinal: Abdomen soft, non-tender.   Genitourinary: Voiding independently.   Extremities: Moves all 4 extremities actively, strength appropriate.   Neurological: A&Ox3.   Psychological: Appropriate mood and behavior.   Skin: Warm and dry, no rashes.      Assessment   Magali Cardenas is a 79 y.o. female referred to the plastic surgery office for follow up with a traumatic scalp laceration which was repaired at [facility] on 1/3/25.     Plan    - Scalp laceration site shows signs of appropriate healing and progression on exam, non-absorbable sutures and staples removed without complication  - Patient is OK to gently cleanse site with warm soapy water, gently pat to dry  - May leave site DOROTA, no dressings required  - Monitor site for s/s of  bleeding, infection or dehiscence which should be reported to her PCP or plastic surgery office if they are to occur  - No acute surgical interventions planned from plastic surgery perspective, will sign off, patient may follow up PRN with plastics for any wound site concerns     Leidy Smith PA-C  Plastic and Reconstructive Surgery

## 2025-04-25 DIAGNOSIS — E78.5 HYPERLIPIDEMIA, UNSPECIFIED: ICD-10-CM

## 2025-04-25 DIAGNOSIS — K21.9 GASTROESOPHAGEAL REFLUX DISEASE WITHOUT ESOPHAGITIS: ICD-10-CM

## 2025-04-25 DIAGNOSIS — E78.49 OTHER HYPERLIPIDEMIA: ICD-10-CM

## 2025-04-29 RX ORDER — FAMOTIDINE 40 MG/1
40 TABLET, FILM COATED ORAL DAILY
Qty: 90 TABLET | Refills: 2 | Status: SHIPPED | OUTPATIENT
Start: 2025-04-29

## 2025-04-29 RX ORDER — PANTOPRAZOLE SODIUM 40 MG/1
TABLET, DELAYED RELEASE ORAL
Qty: 90 TABLET | Refills: 2 | Status: SHIPPED | OUTPATIENT
Start: 2025-04-29

## 2025-04-29 RX ORDER — SIMVASTATIN 20 MG/1
20 TABLET, FILM COATED ORAL EVERY EVENING
Qty: 90 TABLET | Refills: 2 | Status: SHIPPED | OUTPATIENT
Start: 2025-04-29

## 2025-06-03 DIAGNOSIS — I10 ESSENTIAL (PRIMARY) HYPERTENSION: ICD-10-CM

## 2025-06-03 RX ORDER — TRIAMTERENE AND HYDROCHLOROTHIAZIDE 37.5; 25 MG/1; MG/1
1 TABLET ORAL DAILY
Qty: 90 TABLET | Refills: 1 | Status: SHIPPED | OUTPATIENT
Start: 2025-06-03

## 2025-07-24 ENCOUNTER — OFFICE VISIT (OUTPATIENT)
Dept: ORTHOPEDIC SURGERY | Facility: CLINIC | Age: 80
End: 2025-07-24
Payer: MEDICARE

## 2025-07-24 ENCOUNTER — HOSPITAL ENCOUNTER (OUTPATIENT)
Dept: RADIOLOGY | Facility: CLINIC | Age: 80
Discharge: HOME | End: 2025-07-24
Payer: MEDICARE

## 2025-07-24 DIAGNOSIS — M16.11 ARTHRITIS OF RIGHT HIP: ICD-10-CM

## 2025-07-24 DIAGNOSIS — Z47.1 AFTERCARE FOLLOWING JOINT REPLACEMENT SURGERY, UNSPECIFIED JOINT: ICD-10-CM

## 2025-07-24 PROCEDURE — 73562 X-RAY EXAM OF KNEE 3: CPT | Mod: RT

## 2025-07-24 PROCEDURE — 1036F TOBACCO NON-USER: CPT | Performed by: ORTHOPAEDIC SURGERY

## 2025-07-24 PROCEDURE — 99212 OFFICE O/P EST SF 10 MIN: CPT | Performed by: ORTHOPAEDIC SURGERY

## 2025-07-24 PROCEDURE — 1125F AMNT PAIN NOTED PAIN PRSNT: CPT | Performed by: ORTHOPAEDIC SURGERY

## 2025-07-24 PROCEDURE — 1159F MED LIST DOCD IN RCRD: CPT | Performed by: ORTHOPAEDIC SURGERY

## 2025-07-24 PROCEDURE — 99213 OFFICE O/P EST LOW 20 MIN: CPT | Performed by: ORTHOPAEDIC SURGERY

## 2025-07-24 PROCEDURE — 73562 X-RAY EXAM OF KNEE 3: CPT | Mod: RIGHT SIDE | Performed by: RADIOLOGY

## 2025-07-24 ASSESSMENT — PAIN - FUNCTIONAL ASSESSMENT: PAIN_FUNCTIONAL_ASSESSMENT: 0-10

## 2025-07-24 ASSESSMENT — PAIN SCALES - GENERAL: PAINLEVEL_OUTOF10: 8

## 2025-07-24 NOTE — PROGRESS NOTES
Patient is an 79-year-old female who presents today for further evaluation of her right knee.  She has had increasing right knee pain.  She had her knee replacement years ago by Dr. Gagnon.  She had no problems after surgery regards to wound healing or infection.  She has severe osteoarthritis of her right hip which I think may be her main issue.    Right hip:  AAOx3, NAD  Significant pain with flexion internal rotation  Positive Stinchfield  Mild TTP over trochanter laterally  5/5 Hip flexion/knee extension/df/pf/ehl  SILT in a luzma/saph/per/tib distribution  Brisk capillary refill  no other overlying lesions  mood: euthymic  Respirations non labored      Right knee:  AAOx3, NAD  Neutral allignment  Range of motion 0-110  Stable to varus/valgus/anterior/posterior stress through out the range of motion  No no joint line tenderness to palpation  No effusion  SILT in a luzma/saph/per/tib distribution  5/5 knee extension/df/pf/ehl  Significant chronic venous stasis changes    Plain films are reviewed by myself in clinic today.  She has a cemented Stephens triathlon in place with adequate alignment with no signs of gross loosening or failure.  She has severe osteoarthritis of her right hip with complete loss of joint space, underlying sclerosis and bipolar osteophytic change.    We discussed her today that I would like her to undergo a diagnostic lidocaine only injection of her right hip.  I think a lot of her knee pain is likely referred from her hip.  She is complaining that her crunches and cracks however on exam it is actually coming from her hip itself.  I have asked her to even write down how much pain relief she has within the first 2 hours after the injection.  She will let us know how she does.  Do not think she requires anything surgical regarding her knee at this time.  The question would be if her main issue is her hip even if she is a surgical candidate to undergo total hip replacement.  All of her questions  were answered.    This note was created using voice recognition software and was not corrected for typographical or grammatical errors.

## 2025-07-30 DIAGNOSIS — I48.91 ATRIAL FIBRILLATION, UNSPECIFIED TYPE (MULTI): ICD-10-CM

## 2025-07-31 RX ORDER — METOPROLOL SUCCINATE 25 MG/1
25 TABLET, EXTENDED RELEASE ORAL DAILY
Qty: 90 TABLET | Refills: 3 | Status: SHIPPED | OUTPATIENT
Start: 2025-07-31

## 2025-08-27 ENCOUNTER — TELEPHONE (OUTPATIENT)
Dept: ORTHOPEDIC SURGERY | Facility: HOSPITAL | Age: 80
End: 2025-08-27

## 2025-08-27 ENCOUNTER — APPOINTMENT (OUTPATIENT)
Dept: RADIOLOGY | Facility: HOSPITAL | Age: 80
DRG: 563 | End: 2025-08-27
Payer: MEDICARE

## 2025-08-27 ENCOUNTER — APPOINTMENT (OUTPATIENT)
Dept: CARDIOLOGY | Facility: HOSPITAL | Age: 80
DRG: 563 | End: 2025-08-27
Payer: MEDICARE

## 2025-08-27 ENCOUNTER — APPOINTMENT (OUTPATIENT)
Dept: ORTHOPEDIC SURGERY | Facility: HOSPITAL | Age: 80
End: 2025-08-27
Payer: MEDICARE

## 2025-08-27 ENCOUNTER — HOSPITAL ENCOUNTER (INPATIENT)
Facility: HOSPITAL | Age: 80
LOS: 3 days | Discharge: NURSING, REHAB OR LONG-TERM CARE | End: 2025-08-30
Attending: STUDENT IN AN ORGANIZED HEALTH CARE EDUCATION/TRAINING PROGRAM | Admitting: HOSPITALIST
Payer: MEDICARE

## 2025-08-27 DIAGNOSIS — R60.0 EDEMA OF RIGHT LOWER EXTREMITY: ICD-10-CM

## 2025-08-27 DIAGNOSIS — M25.561 ACUTE PAIN OF RIGHT KNEE: ICD-10-CM

## 2025-08-27 DIAGNOSIS — M16.11 ARTHRITIS OF RIGHT HIP: Primary | ICD-10-CM

## 2025-08-27 DIAGNOSIS — S52.615A CLOSED NONDISPLACED FRACTURE OF STYLOID PROCESS OF LEFT ULNA, INITIAL ENCOUNTER: ICD-10-CM

## 2025-08-27 DIAGNOSIS — S52.502A CLOSED FRACTURE OF DISTAL END OF LEFT RADIUS, UNSPECIFIED FRACTURE MORPHOLOGY, INITIAL ENCOUNTER: Primary | ICD-10-CM

## 2025-08-27 DIAGNOSIS — M25.551 RIGHT HIP PAIN: ICD-10-CM

## 2025-08-27 DIAGNOSIS — M79.604 PAIN IN RIGHT LEG: ICD-10-CM

## 2025-08-27 LAB
ANION GAP SERPL CALC-SCNC: 16 MMOL/L (ref 10–20)
APTT PPP: 27 SECONDS (ref 26–36)
BASOPHILS # BLD AUTO: 0.04 X10*3/UL (ref 0–0.1)
BASOPHILS NFR BLD AUTO: 0.8 %
BUN SERPL-MCNC: 29 MG/DL (ref 6–23)
CALCIUM SERPL-MCNC: 9.5 MG/DL (ref 8.6–10.3)
CHLORIDE SERPL-SCNC: 101 MMOL/L (ref 98–107)
CO2 SERPL-SCNC: 22 MMOL/L (ref 21–32)
CREAT SERPL-MCNC: 1.28 MG/DL (ref 0.5–1.05)
EGFRCR SERPLBLD CKD-EPI 2021: 42 ML/MIN/1.73M*2
EOSINOPHIL # BLD AUTO: 0.1 X10*3/UL (ref 0–0.4)
EOSINOPHIL NFR BLD AUTO: 1.9 %
ERYTHROCYTE [DISTWIDTH] IN BLOOD BY AUTOMATED COUNT: 12.8 % (ref 11.5–14.5)
GLUCOSE SERPL-MCNC: 115 MG/DL (ref 74–99)
HCT VFR BLD AUTO: 40.8 % (ref 36–46)
HGB BLD-MCNC: 13.1 G/DL (ref 12–16)
IMM GRANULOCYTES # BLD AUTO: 0.02 X10*3/UL (ref 0–0.5)
IMM GRANULOCYTES NFR BLD AUTO: 0.4 % (ref 0–0.9)
INR PPP: 1.3 (ref 0.9–1.1)
LYMPHOCYTES # BLD AUTO: 0.69 X10*3/UL (ref 0.8–3)
LYMPHOCYTES NFR BLD AUTO: 13.4 %
MCH RBC QN AUTO: 31.5 PG (ref 26–34)
MCHC RBC AUTO-ENTMCNC: 32.1 G/DL (ref 32–36)
MCV RBC AUTO: 98 FL (ref 80–100)
MONOCYTES # BLD AUTO: 0.38 X10*3/UL (ref 0.05–0.8)
MONOCYTES NFR BLD AUTO: 7.4 %
NEUTROPHILS # BLD AUTO: 3.93 X10*3/UL (ref 1.6–5.5)
NEUTROPHILS NFR BLD AUTO: 76.1 %
NRBC BLD-RTO: 0 /100 WBCS (ref 0–0)
PLATELET # BLD AUTO: 248 X10*3/UL (ref 150–450)
POTASSIUM SERPL-SCNC: 3.8 MMOL/L (ref 3.5–5.3)
PROTHROMBIN TIME: 14.6 SECONDS (ref 9.8–12.4)
RBC # BLD AUTO: 4.16 X10*6/UL (ref 4–5.2)
SODIUM SERPL-SCNC: 135 MMOL/L (ref 136–145)
WBC # BLD AUTO: 5.2 X10*3/UL (ref 4.4–11.3)

## 2025-08-27 PROCEDURE — 73700 CT LOWER EXTREMITY W/O DYE: CPT | Mod: RIGHT SIDE | Performed by: RADIOLOGY

## 2025-08-27 PROCEDURE — 36415 COLL VENOUS BLD VENIPUNCTURE: CPT | Performed by: STUDENT IN AN ORGANIZED HEALTH CARE EDUCATION/TRAINING PROGRAM

## 2025-08-27 PROCEDURE — 73560 X-RAY EXAM OF KNEE 1 OR 2: CPT | Mod: RIGHT SIDE | Performed by: RADIOLOGY

## 2025-08-27 PROCEDURE — 85610 PROTHROMBIN TIME: CPT | Performed by: STUDENT IN AN ORGANIZED HEALTH CARE EDUCATION/TRAINING PROGRAM

## 2025-08-27 PROCEDURE — 73110 X-RAY EXAM OF WRIST: CPT | Mod: LEFT SIDE | Performed by: RADIOLOGY

## 2025-08-27 PROCEDURE — 99285 EMERGENCY DEPT VISIT HI MDM: CPT | Performed by: STUDENT IN AN ORGANIZED HEALTH CARE EDUCATION/TRAINING PROGRAM

## 2025-08-27 PROCEDURE — 2500000001 HC RX 250 WO HCPCS SELF ADMINISTERED DRUGS (ALT 637 FOR MEDICARE OP): Performed by: STUDENT IN AN ORGANIZED HEALTH CARE EDUCATION/TRAINING PROGRAM

## 2025-08-27 PROCEDURE — 99222 1ST HOSP IP/OBS MODERATE 55: CPT

## 2025-08-27 PROCEDURE — 2500000004 HC RX 250 GENERAL PHARMACY W/ HCPCS (ALT 636 FOR OP/ED): Performed by: STUDENT IN AN ORGANIZED HEALTH CARE EDUCATION/TRAINING PROGRAM

## 2025-08-27 PROCEDURE — 73552 X-RAY EXAM OF FEMUR 2/>: CPT | Mod: RIGHT SIDE | Performed by: RADIOLOGY

## 2025-08-27 PROCEDURE — 99223 1ST HOSP IP/OBS HIGH 75: CPT | Performed by: HOSPITALIST

## 2025-08-27 PROCEDURE — 73502 X-RAY EXAM HIP UNI 2-3 VIEWS: CPT | Mod: RIGHT SIDE | Performed by: RADIOLOGY

## 2025-08-27 PROCEDURE — 80048 BASIC METABOLIC PNL TOTAL CA: CPT | Performed by: STUDENT IN AN ORGANIZED HEALTH CARE EDUCATION/TRAINING PROGRAM

## 2025-08-27 PROCEDURE — 73130 X-RAY EXAM OF HAND: CPT | Mod: LEFT SIDE | Performed by: RADIOLOGY

## 2025-08-27 PROCEDURE — 85025 COMPLETE CBC W/AUTO DIFF WBC: CPT | Performed by: STUDENT IN AN ORGANIZED HEALTH CARE EDUCATION/TRAINING PROGRAM

## 2025-08-27 PROCEDURE — 1210000001 HC SEMI-PRIVATE ROOM DAILY

## 2025-08-27 PROCEDURE — 29125 APPL SHORT ARM SPLINT STATIC: CPT | Performed by: STUDENT IN AN ORGANIZED HEALTH CARE EDUCATION/TRAINING PROGRAM

## 2025-08-27 RX ORDER — ACETAMINOPHEN 325 MG/1
975 TABLET ORAL ONCE
Status: DISCONTINUED | OUTPATIENT
Start: 2025-08-27 | End: 2025-08-27

## 2025-08-27 RX ORDER — ACETAMINOPHEN 160 MG/5ML
1000 SOLUTION ORAL ONCE
Status: COMPLETED | OUTPATIENT
Start: 2025-08-27 | End: 2025-08-27

## 2025-08-27 RX ORDER — OXYCODONE HYDROCHLORIDE 5 MG/1
5 TABLET ORAL ONCE
Refills: 0 | Status: COMPLETED | OUTPATIENT
Start: 2025-08-27 | End: 2025-08-27

## 2025-08-27 RX ORDER — LIDOCAINE HYDROCHLORIDE 10 MG/ML
5 INJECTION, SOLUTION INFILTRATION; PERINEURAL ONCE
Status: COMPLETED | OUTPATIENT
Start: 2025-08-27 | End: 2025-08-27

## 2025-08-27 RX ORDER — BUPIVACAINE HYDROCHLORIDE 5 MG/ML
10 INJECTION, SOLUTION PERINEURAL ONCE
Status: COMPLETED | OUTPATIENT
Start: 2025-08-27 | End: 2025-08-27

## 2025-08-27 RX ADMIN — OXYCODONE HYDROCHLORIDE 5 MG: 5 TABLET ORAL at 18:23

## 2025-08-27 RX ADMIN — LIDOCAINE HYDROCHLORIDE 5 ML: 10 INJECTION, SOLUTION INFILTRATION; PERINEURAL at 23:45

## 2025-08-27 RX ADMIN — BUPIVACAINE HYDROCHLORIDE 50 MG: 5 INJECTION, SOLUTION PERINEURAL at 23:46

## 2025-08-27 RX ADMIN — ACETAMINOPHEN 1000 MG: 650 SOLUTION ORAL at 16:55

## 2025-08-27 ASSESSMENT — PAIN SCALES - GENERAL
PAINLEVEL_OUTOF10: 0 - NO PAIN
PAINLEVEL_OUTOF10: 10 - WORST POSSIBLE PAIN
PAINLEVEL_OUTOF10: 9
PAINLEVEL_OUTOF10: 9

## 2025-08-27 ASSESSMENT — PAIN DESCRIPTION - ORIENTATION
ORIENTATION: LEFT
ORIENTATION: RIGHT
ORIENTATION: RIGHT

## 2025-08-27 ASSESSMENT — ENCOUNTER SYMPTOMS
HEMATOLOGIC/LYMPHATIC NEGATIVE: 1
EYES NEGATIVE: 1
CONSTITUTIONAL NEGATIVE: 1
NEUROLOGICAL NEGATIVE: 1
GASTROINTESTINAL NEGATIVE: 1
PSYCHIATRIC NEGATIVE: 1
CARDIOVASCULAR NEGATIVE: 1
ALLERGIC/IMMUNOLOGIC NEGATIVE: 1
ARTHRALGIAS: 1
RESPIRATORY NEGATIVE: 1

## 2025-08-27 ASSESSMENT — PAIN DESCRIPTION - PAIN TYPE: TYPE: ACUTE PAIN

## 2025-08-27 ASSESSMENT — PAIN DESCRIPTION - LOCATION
LOCATION: KNEE
LOCATION: WRIST
LOCATION: KNEE

## 2025-08-27 ASSESSMENT — PAIN - FUNCTIONAL ASSESSMENT
PAIN_FUNCTIONAL_ASSESSMENT: 0-10

## 2025-08-28 LAB
ANION GAP SERPL CALC-SCNC: 9 MMOL/L (ref 10–20)
ATRIAL RATE: 62 BPM
BASOPHILS # BLD AUTO: 0.03 X10*3/UL (ref 0–0.1)
BASOPHILS NFR BLD AUTO: 0.6 %
BUN SERPL-MCNC: 22 MG/DL (ref 6–23)
CALCIUM SERPL-MCNC: 8.8 MG/DL (ref 8.6–10.3)
CHLORIDE SERPL-SCNC: 106 MMOL/L (ref 98–107)
CO2 SERPL-SCNC: 27 MMOL/L (ref 21–32)
CREAT SERPL-MCNC: 1.02 MG/DL (ref 0.5–1.05)
EGFRCR SERPLBLD CKD-EPI 2021: 55 ML/MIN/1.73M*2
EOSINOPHIL # BLD AUTO: 0.09 X10*3/UL (ref 0–0.4)
EOSINOPHIL NFR BLD AUTO: 1.9 %
ERYTHROCYTE [DISTWIDTH] IN BLOOD BY AUTOMATED COUNT: 12.8 % (ref 11.5–14.5)
GLUCOSE SERPL-MCNC: 81 MG/DL (ref 74–99)
HCT VFR BLD AUTO: 32.8 % (ref 36–46)
HGB BLD-MCNC: 11 G/DL (ref 12–16)
IMM GRANULOCYTES # BLD AUTO: 0.02 X10*3/UL (ref 0–0.5)
IMM GRANULOCYTES NFR BLD AUTO: 0.4 % (ref 0–0.9)
LYMPHOCYTES # BLD AUTO: 0.83 X10*3/UL (ref 0.8–3)
LYMPHOCYTES NFR BLD AUTO: 17.6 %
MAGNESIUM SERPL-MCNC: 1.94 MG/DL (ref 1.6–2.4)
MCH RBC QN AUTO: 31.5 PG (ref 26–34)
MCHC RBC AUTO-ENTMCNC: 33.5 G/DL (ref 32–36)
MCV RBC AUTO: 94 FL (ref 80–100)
MONOCYTES # BLD AUTO: 0.35 X10*3/UL (ref 0.05–0.8)
MONOCYTES NFR BLD AUTO: 7.4 %
NEUTROPHILS # BLD AUTO: 3.39 X10*3/UL (ref 1.6–5.5)
NEUTROPHILS NFR BLD AUTO: 72.1 %
NRBC BLD-RTO: 0 /100 WBCS (ref 0–0)
P AXIS: 55 DEGREES
P OFFSET: 197 MS
P ONSET: 133 MS
PLATELET # BLD AUTO: 189 X10*3/UL (ref 150–450)
POTASSIUM SERPL-SCNC: 3.4 MMOL/L (ref 3.5–5.3)
PR INTERVAL: 178 MS
Q ONSET: 222 MS
QRS COUNT: 10 BEATS
QRS DURATION: 76 MS
QT INTERVAL: 436 MS
QTC CALCULATION(BAZETT): 442 MS
QTC FREDERICIA: 441 MS
R AXIS: 52 DEGREES
RBC # BLD AUTO: 3.49 X10*6/UL (ref 4–5.2)
SODIUM SERPL-SCNC: 139 MMOL/L (ref 136–145)
T AXIS: 72 DEGREES
T OFFSET: 440 MS
VENTRICULAR RATE: 62 BPM
WBC # BLD AUTO: 4.7 X10*3/UL (ref 4.4–11.3)

## 2025-08-28 PROCEDURE — 2500000001 HC RX 250 WO HCPCS SELF ADMINISTERED DRUGS (ALT 637 FOR MEDICARE OP): Performed by: HOSPITALIST

## 2025-08-28 PROCEDURE — 97535 SELF CARE MNGMENT TRAINING: CPT | Mod: GO | Performed by: OCCUPATIONAL THERAPIST

## 2025-08-28 PROCEDURE — 85025 COMPLETE CBC W/AUTO DIFF WBC: CPT | Performed by: HOSPITALIST

## 2025-08-28 PROCEDURE — 2500000004 HC RX 250 GENERAL PHARMACY W/ HCPCS (ALT 636 FOR OP/ED): Performed by: HOSPITALIST

## 2025-08-28 PROCEDURE — 83735 ASSAY OF MAGNESIUM: CPT | Performed by: HOSPITALIST

## 2025-08-28 PROCEDURE — 97162 PT EVAL MOD COMPLEX 30 MIN: CPT | Mod: GP

## 2025-08-28 PROCEDURE — 99233 SBSQ HOSP IP/OBS HIGH 50: CPT | Performed by: INTERNAL MEDICINE

## 2025-08-28 PROCEDURE — 82565 ASSAY OF CREATININE: CPT | Performed by: HOSPITALIST

## 2025-08-28 PROCEDURE — 2500000002 HC RX 250 W HCPCS SELF ADMINISTERED DRUGS (ALT 637 FOR MEDICARE OP, ALT 636 FOR OP/ED): Performed by: INTERNAL MEDICINE

## 2025-08-28 PROCEDURE — 36415 COLL VENOUS BLD VENIPUNCTURE: CPT | Performed by: HOSPITALIST

## 2025-08-28 PROCEDURE — 2500000002 HC RX 250 W HCPCS SELF ADMINISTERED DRUGS (ALT 637 FOR MEDICARE OP, ALT 636 FOR OP/ED): Performed by: HOSPITALIST

## 2025-08-28 PROCEDURE — 2500000005 HC RX 250 GENERAL PHARMACY W/O HCPCS: Performed by: HOSPITALIST

## 2025-08-28 PROCEDURE — 97166 OT EVAL MOD COMPLEX 45 MIN: CPT | Mod: GO | Performed by: OCCUPATIONAL THERAPIST

## 2025-08-28 PROCEDURE — 2500000001 HC RX 250 WO HCPCS SELF ADMINISTERED DRUGS (ALT 637 FOR MEDICARE OP): Performed by: PHARMACIST

## 2025-08-28 PROCEDURE — 97530 THERAPEUTIC ACTIVITIES: CPT | Mod: GP

## 2025-08-28 PROCEDURE — 1200000002 HC GENERAL ROOM WITH TELEMETRY DAILY

## 2025-08-28 RX ORDER — CHOLECALCIFEROL (VITAMIN D3) 25 MCG
1000 TABLET ORAL DAILY
Status: DISCONTINUED | OUTPATIENT
Start: 2025-08-28 | End: 2025-08-30 | Stop reason: HOSPADM

## 2025-08-28 RX ORDER — ONDANSETRON 4 MG/1
4 TABLET, ORALLY DISINTEGRATING ORAL EVERY 8 HOURS PRN
Status: DISCONTINUED | OUTPATIENT
Start: 2025-08-28 | End: 2025-08-30 | Stop reason: HOSPADM

## 2025-08-28 RX ORDER — OXYCODONE HYDROCHLORIDE 5 MG/1
5 TABLET ORAL EVERY 4 HOURS PRN
Status: DISCONTINUED | OUTPATIENT
Start: 2025-08-28 | End: 2025-08-30 | Stop reason: HOSPADM

## 2025-08-28 RX ORDER — TALC
3 POWDER (GRAM) TOPICAL NIGHTLY PRN
Status: DISCONTINUED | OUTPATIENT
Start: 2025-08-28 | End: 2025-08-30 | Stop reason: HOSPADM

## 2025-08-28 RX ORDER — ACETAMINOPHEN 325 MG/1
650 TABLET ORAL EVERY 4 HOURS PRN
Status: DISCONTINUED | OUTPATIENT
Start: 2025-08-28 | End: 2025-08-30 | Stop reason: HOSPADM

## 2025-08-28 RX ORDER — CHOLECALCIFEROL (VITAMIN D3) 25 MCG
1000 TABLET ORAL
Status: DISCONTINUED | OUTPATIENT
Start: 2025-08-28 | End: 2025-08-28

## 2025-08-28 RX ORDER — POTASSIUM CHLORIDE 20 MEQ/1
40 TABLET, EXTENDED RELEASE ORAL ONCE
Status: COMPLETED | OUTPATIENT
Start: 2025-08-28 | End: 2025-08-28

## 2025-08-28 RX ORDER — SIMVASTATIN 20 MG/1
20 TABLET, FILM COATED ORAL EVERY EVENING
Status: DISCONTINUED | OUTPATIENT
Start: 2025-08-28 | End: 2025-08-30 | Stop reason: HOSPADM

## 2025-08-28 RX ORDER — TRIAMTERENE AND HYDROCHLOROTHIAZIDE 37.5; 25 MG/1; MG/1
1 TABLET ORAL DAILY
Status: DISCONTINUED | OUTPATIENT
Start: 2025-08-28 | End: 2025-08-30 | Stop reason: HOSPADM

## 2025-08-28 RX ORDER — MORPHINE SULFATE 2 MG/ML
2 INJECTION, SOLUTION INTRAMUSCULAR; INTRAVENOUS EVERY 4 HOURS PRN
Status: DISCONTINUED | OUTPATIENT
Start: 2025-08-28 | End: 2025-08-30 | Stop reason: HOSPADM

## 2025-08-28 RX ORDER — ONDANSETRON HYDROCHLORIDE 2 MG/ML
4 INJECTION, SOLUTION INTRAVENOUS EVERY 8 HOURS PRN
Status: DISCONTINUED | OUTPATIENT
Start: 2025-08-28 | End: 2025-08-30 | Stop reason: HOSPADM

## 2025-08-28 RX ORDER — FAMOTIDINE 20 MG/1
40 TABLET, FILM COATED ORAL DAILY
Status: DISCONTINUED | OUTPATIENT
Start: 2025-08-28 | End: 2025-08-30 | Stop reason: HOSPADM

## 2025-08-28 RX ORDER — METOPROLOL SUCCINATE 25 MG/1
25 TABLET, EXTENDED RELEASE ORAL DAILY
Status: DISCONTINUED | OUTPATIENT
Start: 2025-08-28 | End: 2025-08-30 | Stop reason: HOSPADM

## 2025-08-28 RX ORDER — SODIUM CHLORIDE 9 MG/ML
100 INJECTION, SOLUTION INTRAVENOUS CONTINUOUS
Status: ACTIVE | OUTPATIENT
Start: 2025-08-28 | End: 2025-08-29

## 2025-08-28 RX ORDER — CALCIUM CARBONATE 200(500)MG
2 TABLET,CHEWABLE ORAL 4 TIMES DAILY PRN
Status: DISCONTINUED | OUTPATIENT
Start: 2025-08-28 | End: 2025-08-30 | Stop reason: HOSPADM

## 2025-08-28 RX ORDER — SENNOSIDES 8.6 MG/1
2 TABLET ORAL 2 TIMES DAILY
Status: DISCONTINUED | OUTPATIENT
Start: 2025-08-28 | End: 2025-08-30 | Stop reason: HOSPADM

## 2025-08-28 RX ORDER — PANTOPRAZOLE SODIUM 40 MG/1
40 TABLET, DELAYED RELEASE ORAL
Status: DISCONTINUED | OUTPATIENT
Start: 2025-08-28 | End: 2025-08-30 | Stop reason: HOSPADM

## 2025-08-28 RX ADMIN — SENNOSIDES 17.2 MG: 8.6 TABLET, FILM COATED ORAL at 10:06

## 2025-08-28 RX ADMIN — APIXABAN 2.5 MG: 2.5 TABLET, FILM COATED ORAL at 20:34

## 2025-08-28 RX ADMIN — OXYCODONE HYDROCHLORIDE 5 MG: 5 TABLET ORAL at 15:46

## 2025-08-28 RX ADMIN — OXYCODONE HYDROCHLORIDE 5 MG: 5 TABLET ORAL at 11:10

## 2025-08-28 RX ADMIN — PANTOPRAZOLE SODIUM 40 MG: 40 TABLET, DELAYED RELEASE ORAL at 06:42

## 2025-08-28 RX ADMIN — SODIUM CHLORIDE 100 ML/HR: 0.9 INJECTION, SOLUTION INTRAVENOUS at 01:53

## 2025-08-28 RX ADMIN — OXYCODONE HYDROCHLORIDE 5 MG: 5 TABLET ORAL at 06:44

## 2025-08-28 RX ADMIN — SENNOSIDES 17.2 MG: 8.6 TABLET, FILM COATED ORAL at 20:34

## 2025-08-28 RX ADMIN — OXYCODONE HYDROCHLORIDE 5 MG: 5 TABLET ORAL at 01:52

## 2025-08-28 RX ADMIN — OXYCODONE HYDROCHLORIDE 5 MG: 5 TABLET ORAL at 20:32

## 2025-08-28 RX ADMIN — Medication 1000 UNITS: at 10:07

## 2025-08-28 RX ADMIN — SIMVASTATIN 20 MG: 20 TABLET, FILM COATED ORAL at 20:34

## 2025-08-28 RX ADMIN — APIXABAN 2.5 MG: 2.5 TABLET, FILM COATED ORAL at 10:06

## 2025-08-28 RX ADMIN — FAMOTIDINE 40 MG: 20 TABLET, FILM COATED ORAL at 20:38

## 2025-08-28 RX ADMIN — Medication 3 MG: at 01:52

## 2025-08-28 RX ADMIN — POTASSIUM CHLORIDE 40 MEQ: 1500 TABLET, EXTENDED RELEASE ORAL at 11:51

## 2025-08-28 SDOH — ECONOMIC STABILITY: FOOD INSECURITY: WITHIN THE PAST 12 MONTHS, YOU WORRIED THAT YOUR FOOD WOULD RUN OUT BEFORE YOU GOT THE MONEY TO BUY MORE.: NEVER TRUE

## 2025-08-28 SDOH — ECONOMIC STABILITY: INCOME INSECURITY: IN THE PAST 12 MONTHS HAS THE ELECTRIC, GAS, OIL, OR WATER COMPANY THREATENED TO SHUT OFF SERVICES IN YOUR HOME?: NO

## 2025-08-28 SDOH — SOCIAL STABILITY: SOCIAL INSECURITY: DO YOU FEEL UNSAFE GOING BACK TO THE PLACE WHERE YOU ARE LIVING?: NO

## 2025-08-28 SDOH — SOCIAL STABILITY: SOCIAL INSECURITY: HAVE YOU HAD ANY THOUGHTS OF HARMING ANYONE ELSE?: NO

## 2025-08-28 SDOH — SOCIAL STABILITY: SOCIAL INSECURITY: WITHIN THE LAST YEAR, HAVE YOU BEEN HUMILIATED OR EMOTIONALLY ABUSED IN OTHER WAYS BY YOUR PARTNER OR EX-PARTNER?: NO

## 2025-08-28 SDOH — SOCIAL STABILITY: SOCIAL INSECURITY: WITHIN THE LAST YEAR, HAVE YOU BEEN AFRAID OF YOUR PARTNER OR EX-PARTNER?: NO

## 2025-08-28 SDOH — ECONOMIC STABILITY: FOOD INSECURITY: WITHIN THE PAST 12 MONTHS, THE FOOD YOU BOUGHT JUST DIDN'T LAST AND YOU DIDN'T HAVE MONEY TO GET MORE.: NEVER TRUE

## 2025-08-28 SDOH — SOCIAL STABILITY: SOCIAL INSECURITY: DO YOU FEEL ANYONE HAS EXPLOITED OR TAKEN ADVANTAGE OF YOU FINANCIALLY OR OF YOUR PERSONAL PROPERTY?: NO

## 2025-08-28 SDOH — SOCIAL STABILITY: SOCIAL INSECURITY
WITHIN THE LAST YEAR, HAVE YOU BEEN KICKED, HIT, SLAPPED, OR OTHERWISE PHYSICALLY HURT BY YOUR PARTNER OR EX-PARTNER?: NO

## 2025-08-28 SDOH — SOCIAL STABILITY: SOCIAL INSECURITY
WITHIN THE LAST YEAR, HAVE YOU BEEN RAPED OR FORCED TO HAVE ANY KIND OF SEXUAL ACTIVITY BY YOUR PARTNER OR EX-PARTNER?: NO

## 2025-08-28 SDOH — SOCIAL STABILITY: SOCIAL INSECURITY: ABUSE: ADULT

## 2025-08-28 SDOH — SOCIAL STABILITY: SOCIAL INSECURITY: DOES ANYONE TRY TO KEEP YOU FROM HAVING/CONTACTING OTHER FRIENDS OR DOING THINGS OUTSIDE YOUR HOME?: NO

## 2025-08-28 SDOH — SOCIAL STABILITY: SOCIAL INSECURITY: HAVE YOU HAD THOUGHTS OF HARMING ANYONE ELSE?: NO

## 2025-08-28 SDOH — SOCIAL STABILITY: SOCIAL INSECURITY: HAS ANYONE EVER THREATENED TO HURT YOUR FAMILY OR YOUR PETS?: NO

## 2025-08-28 SDOH — SOCIAL STABILITY: SOCIAL INSECURITY: ARE YOU OR HAVE YOU BEEN THREATENED OR ABUSED PHYSICALLY, EMOTIONALLY, OR SEXUALLY BY ANYONE?: NO

## 2025-08-28 ASSESSMENT — COGNITIVE AND FUNCTIONAL STATUS - GENERAL
DRESSING REGULAR LOWER BODY CLOTHING: A LOT
EATING MEALS: A LITTLE
TOILETING: A LOT
DAILY ACTIVITIY SCORE: 11
WALKING IN HOSPITAL ROOM: TOTAL
DRESSING REGULAR UPPER BODY CLOTHING: A LOT
DAILY ACTIVITIY SCORE: 15
DRESSING REGULAR UPPER BODY CLOTHING: A LITTLE
STANDING UP FROM CHAIR USING ARMS: TOTAL
EATING MEALS: A LITTLE
MOBILITY SCORE: 6
HELP NEEDED FOR BATHING: A LOT
MOVING TO AND FROM BED TO CHAIR: TOTAL
DAILY ACTIVITIY SCORE: 15
MOBILITY SCORE: 11
HELP NEEDED FOR BATHING: A LOT
WALKING IN HOSPITAL ROOM: A LOT
PERSONAL GROOMING: A LOT
EATING MEALS: A LITTLE
MOVING TO AND FROM BED TO CHAIR: TOTAL
TOILETING: A LOT
TURNING FROM BACK TO SIDE WHILE IN FLAT BAD: TOTAL
PERSONAL GROOMING: A LITTLE
MOVING FROM LYING ON BACK TO SITTING ON SIDE OF FLAT BED WITH BEDRAILS: A LOT
STANDING UP FROM CHAIR USING ARMS: A LOT
WALKING IN HOSPITAL ROOM: TOTAL
TURNING FROM BACK TO SIDE WHILE IN FLAT BAD: A LOT
DRESSING REGULAR LOWER BODY CLOTHING: TOTAL
MOVING FROM LYING ON BACK TO SITTING ON SIDE OF FLAT BED WITH BEDRAILS: A LOT
MOBILITY SCORE: 8
CLIMB 3 TO 5 STEPS WITH RAILING: TOTAL
MOVING TO AND FROM BED TO CHAIR: A LOT
TOILETING: TOTAL
STANDING UP FROM CHAIR USING ARMS: TOTAL
PATIENT BASELINE BEDBOUND: NO
TURNING FROM BACK TO SIDE WHILE IN FLAT BAD: A LOT
CLIMB 3 TO 5 STEPS WITH RAILING: TOTAL
HELP NEEDED FOR BATHING: A LOT
DRESSING REGULAR LOWER BODY CLOTHING: A LOT
MOVING FROM LYING ON BACK TO SITTING ON SIDE OF FLAT BED WITH BEDRAILS: TOTAL
CLIMB 3 TO 5 STEPS WITH RAILING: TOTAL
PERSONAL GROOMING: A LITTLE
DRESSING REGULAR UPPER BODY CLOTHING: A LITTLE

## 2025-08-28 ASSESSMENT — ACTIVITIES OF DAILY LIVING (ADL)
HEARING - LEFT EAR: FUNCTIONAL
ADL_ASSISTANCE: INDEPENDENT
WALKS IN HOME: NEEDS ASSISTANCE
GROOMING: INDEPENDENT
ADLS_ADDRESSED: NO
HOME_MANAGEMENT_TIME_ENTRY: 10
FEEDING YOURSELF: INDEPENDENT
TOILETING: NEEDS ASSISTANCE
LACK_OF_TRANSPORTATION: NO
BATHING_ASSISTANCE: MAXIMAL
LACK_OF_TRANSPORTATION: NO
BATHING: NEEDS ASSISTANCE
DRESSING YOURSELF: NEEDS ASSISTANCE
PATIENT'S MEMORY ADEQUATE TO SAFELY COMPLETE DAILY ACTIVITIES?: YES
ADEQUATE_TO_COMPLETE_ADL: YES
HEARING - RIGHT EAR: FUNCTIONAL
JUDGMENT_ADEQUATE_SAFELY_COMPLETE_DAILY_ACTIVITIES: YES
ADL_ASSISTANCE: INDEPENDENT

## 2025-08-28 ASSESSMENT — PAIN DESCRIPTION - LOCATION
LOCATION: KNEE
LOCATION: RIB CAGE

## 2025-08-28 ASSESSMENT — PAIN - FUNCTIONAL ASSESSMENT

## 2025-08-28 ASSESSMENT — PATIENT HEALTH QUESTIONNAIRE - PHQ9
1. LITTLE INTEREST OR PLEASURE IN DOING THINGS: NOT AT ALL
2. FEELING DOWN, DEPRESSED OR HOPELESS: NOT AT ALL
SUM OF ALL RESPONSES TO PHQ9 QUESTIONS 1 & 2: 0

## 2025-08-28 ASSESSMENT — PAIN DESCRIPTION - DESCRIPTORS
DESCRIPTORS: ACHING;DISCOMFORT;DULL
DESCRIPTORS: ACHING

## 2025-08-28 ASSESSMENT — PAIN SCALES - GENERAL
PAINLEVEL_OUTOF10: 5 - MODERATE PAIN
PAINLEVEL_OUTOF10: 6
PAINLEVEL_OUTOF10: 7
PAINLEVEL_OUTOF10: 4
PAINLEVEL_OUTOF10: 0 - NO PAIN
PAINLEVEL_OUTOF10: 5 - MODERATE PAIN
PAINLEVEL_OUTOF10: 0 - NO PAIN
PAINLEVEL_OUTOF10: 8
PAINLEVEL_OUTOF10: 6
PAINLEVEL_OUTOF10: 3
PAINLEVEL_OUTOF10: 7
PAINLEVEL_OUTOF10: 7
PAINLEVEL_OUTOF10: 4
PAINLEVEL_OUTOF10: 5 - MODERATE PAIN
PAINLEVEL_OUTOF10: 9
PAINLEVEL_OUTOF10: 7

## 2025-08-28 ASSESSMENT — PAIN DESCRIPTION - ORIENTATION
ORIENTATION: RIGHT
ORIENTATION: LEFT

## 2025-08-28 ASSESSMENT — LIFESTYLE VARIABLES
HOW OFTEN DO YOU HAVE A DRINK CONTAINING ALCOHOL: 2-4 TIMES A MONTH
SKIP TO QUESTIONS 9-10: 1
HOW MANY STANDARD DRINKS CONTAINING ALCOHOL DO YOU HAVE ON A TYPICAL DAY: PATIENT DOES NOT DRINK
AUDIT-C TOTAL SCORE: 2
HOW OFTEN DO YOU HAVE 6 OR MORE DRINKS ON ONE OCCASION: NEVER
AUDIT-C TOTAL SCORE: 2

## 2025-08-29 ENCOUNTER — APPOINTMENT (OUTPATIENT)
Dept: VASCULAR MEDICINE | Facility: HOSPITAL | Age: 80
End: 2025-08-29
Payer: MEDICARE

## 2025-08-29 LAB
ANION GAP SERPL CALC-SCNC: 11 MMOL/L (ref 10–20)
BODY SURFACE AREA: 1.77 M2
BUN SERPL-MCNC: 14 MG/DL (ref 6–23)
CALCIUM SERPL-MCNC: 8.2 MG/DL (ref 8.6–10.3)
CHLORIDE SERPL-SCNC: 106 MMOL/L (ref 98–107)
CO2 SERPL-SCNC: 22 MMOL/L (ref 21–32)
CREAT SERPL-MCNC: 0.69 MG/DL (ref 0.5–1.05)
EGFRCR SERPLBLD CKD-EPI 2021: 87 ML/MIN/1.73M*2
ERYTHROCYTE [DISTWIDTH] IN BLOOD BY AUTOMATED COUNT: 13.2 % (ref 11.5–14.5)
GLUCOSE SERPL-MCNC: 81 MG/DL (ref 74–99)
HCT VFR BLD AUTO: 32.5 % (ref 36–46)
HGB BLD-MCNC: 10 G/DL (ref 12–16)
MCH RBC QN AUTO: 32.1 PG (ref 26–34)
MCHC RBC AUTO-ENTMCNC: 30.8 G/DL (ref 32–36)
MCV RBC AUTO: 104 FL (ref 80–100)
NRBC BLD-RTO: 0 /100 WBCS (ref 0–0)
PLATELET # BLD AUTO: 142 X10*3/UL (ref 150–450)
POTASSIUM SERPL-SCNC: 4.2 MMOL/L (ref 3.5–5.3)
RBC # BLD AUTO: 3.12 X10*6/UL (ref 4–5.2)
SODIUM SERPL-SCNC: 135 MMOL/L (ref 136–145)
WBC # BLD AUTO: 4.6 X10*3/UL (ref 4.4–11.3)

## 2025-08-29 PROCEDURE — 97110 THERAPEUTIC EXERCISES: CPT | Mod: GO | Performed by: OCCUPATIONAL THERAPIST

## 2025-08-29 PROCEDURE — 97140 MANUAL THERAPY 1/> REGIONS: CPT | Mod: GP,CQ

## 2025-08-29 PROCEDURE — 85027 COMPLETE CBC AUTOMATED: CPT | Performed by: INTERNAL MEDICINE

## 2025-08-29 PROCEDURE — 93971 EXTREMITY STUDY: CPT | Performed by: INTERNAL MEDICINE

## 2025-08-29 PROCEDURE — 99232 SBSQ HOSP IP/OBS MODERATE 35: CPT | Performed by: INTERNAL MEDICINE

## 2025-08-29 PROCEDURE — 1200000002 HC GENERAL ROOM WITH TELEMETRY DAILY

## 2025-08-29 PROCEDURE — 93971 EXTREMITY STUDY: CPT

## 2025-08-29 PROCEDURE — 80048 BASIC METABOLIC PNL TOTAL CA: CPT | Performed by: INTERNAL MEDICINE

## 2025-08-29 PROCEDURE — 97530 THERAPEUTIC ACTIVITIES: CPT | Mod: GP,CQ

## 2025-08-29 PROCEDURE — 36415 COLL VENOUS BLD VENIPUNCTURE: CPT | Performed by: INTERNAL MEDICINE

## 2025-08-29 PROCEDURE — 2500000001 HC RX 250 WO HCPCS SELF ADMINISTERED DRUGS (ALT 637 FOR MEDICARE OP): Performed by: HOSPITALIST

## 2025-08-29 PROCEDURE — 2500000002 HC RX 250 W HCPCS SELF ADMINISTERED DRUGS (ALT 637 FOR MEDICARE OP, ALT 636 FOR OP/ED): Performed by: HOSPITALIST

## 2025-08-29 PROCEDURE — 2500000001 HC RX 250 WO HCPCS SELF ADMINISTERED DRUGS (ALT 637 FOR MEDICARE OP): Performed by: PHARMACIST

## 2025-08-29 RX ADMIN — OXYCODONE HYDROCHLORIDE 5 MG: 5 TABLET ORAL at 01:08

## 2025-08-29 RX ADMIN — APIXABAN 2.5 MG: 2.5 TABLET, FILM COATED ORAL at 20:28

## 2025-08-29 RX ADMIN — OXYCODONE HYDROCHLORIDE 5 MG: 5 TABLET ORAL at 08:32

## 2025-08-29 RX ADMIN — METOPROLOL SUCCINATE 25 MG: 25 TABLET, EXTENDED RELEASE ORAL at 08:32

## 2025-08-29 RX ADMIN — Medication 1000 UNITS: at 08:31

## 2025-08-29 RX ADMIN — ACETAMINOPHEN 650 MG: 325 TABLET ORAL at 20:49

## 2025-08-29 RX ADMIN — SENNOSIDES 17.2 MG: 8.6 TABLET, FILM COATED ORAL at 08:31

## 2025-08-29 RX ADMIN — SIMVASTATIN 20 MG: 20 TABLET, FILM COATED ORAL at 20:28

## 2025-08-29 RX ADMIN — SENNOSIDES 17.2 MG: 8.6 TABLET, FILM COATED ORAL at 20:28

## 2025-08-29 RX ADMIN — APIXABAN 2.5 MG: 2.5 TABLET, FILM COATED ORAL at 08:32

## 2025-08-29 RX ADMIN — PANTOPRAZOLE SODIUM 40 MG: 40 TABLET, DELAYED RELEASE ORAL at 06:09

## 2025-08-29 RX ADMIN — OXYCODONE HYDROCHLORIDE 5 MG: 5 TABLET ORAL at 18:47

## 2025-08-29 ASSESSMENT — COGNITIVE AND FUNCTIONAL STATUS - GENERAL
DRESSING REGULAR LOWER BODY CLOTHING: TOTAL
MOVING FROM LYING ON BACK TO SITTING ON SIDE OF FLAT BED WITH BEDRAILS: A LOT
TOILETING: A LOT
MOVING FROM LYING ON BACK TO SITTING ON SIDE OF FLAT BED WITH BEDRAILS: A LOT
PERSONAL GROOMING: A LITTLE
HELP NEEDED FOR BATHING: A LOT
MOVING TO AND FROM BED TO CHAIR: TOTAL
DRESSING REGULAR LOWER BODY CLOTHING: A LOT
DRESSING REGULAR UPPER BODY CLOTHING: A LOT
TOILETING: A LOT
EATING MEALS: A LITTLE
TURNING FROM BACK TO SIDE WHILE IN FLAT BAD: TOTAL
EATING MEALS: A LITTLE
MOBILITY SCORE: 7
DRESSING REGULAR LOWER BODY CLOTHING: A LOT
EATING MEALS: A LITTLE
TURNING FROM BACK TO SIDE WHILE IN FLAT BAD: A LOT
PERSONAL GROOMING: A LITTLE
MOVING FROM LYING ON BACK TO SITTING ON SIDE OF FLAT BED WITH BEDRAILS: A LOT
TURNING FROM BACK TO SIDE WHILE IN FLAT BAD: TOTAL
DRESSING REGULAR UPPER BODY CLOTHING: A LITTLE
WALKING IN HOSPITAL ROOM: TOTAL
MOBILITY SCORE: 7
STANDING UP FROM CHAIR USING ARMS: TOTAL
CLIMB 3 TO 5 STEPS WITH RAILING: TOTAL
WALKING IN HOSPITAL ROOM: TOTAL
CLIMB 3 TO 5 STEPS WITH RAILING: TOTAL
TOILETING: TOTAL
MOVING TO AND FROM BED TO CHAIR: TOTAL
DAILY ACTIVITIY SCORE: 11
CLIMB 3 TO 5 STEPS WITH RAILING: TOTAL
MOVING TO AND FROM BED TO CHAIR: TOTAL
PERSONAL GROOMING: A LOT
DAILY ACTIVITIY SCORE: 14
STANDING UP FROM CHAIR USING ARMS: TOTAL
MOBILITY SCORE: 8
HELP NEEDED FOR BATHING: A LOT
WALKING IN HOSPITAL ROOM: TOTAL
HELP NEEDED FOR BATHING: A LOT
STANDING UP FROM CHAIR USING ARMS: TOTAL
DAILY ACTIVITIY SCORE: 15
DRESSING REGULAR UPPER BODY CLOTHING: A LOT

## 2025-08-29 ASSESSMENT — PAIN - FUNCTIONAL ASSESSMENT
PAIN_FUNCTIONAL_ASSESSMENT: 0-10
PAIN_FUNCTIONAL_ASSESSMENT: UNABLE TO SELF-REPORT
PAIN_FUNCTIONAL_ASSESSMENT: 0-10
PAIN_FUNCTIONAL_ASSESSMENT: 0-10

## 2025-08-29 ASSESSMENT — PAIN DESCRIPTION - ORIENTATION
ORIENTATION: RIGHT
ORIENTATION: LEFT
ORIENTATION: RIGHT

## 2025-08-29 ASSESSMENT — PAIN SCALES - GENERAL
PAINLEVEL_OUTOF10: 8
PAINLEVEL_OUTOF10: 8
PAINLEVEL_OUTOF10: 3
PAINLEVEL_OUTOF10: 8
PAINLEVEL_OUTOF10: 7
PAINLEVEL_OUTOF10: 3
PAINLEVEL_OUTOF10: 7
PAINLEVEL_OUTOF10: 5 - MODERATE PAIN

## 2025-08-29 ASSESSMENT — PAIN DESCRIPTION - DESCRIPTORS
DESCRIPTORS: ACHING;DISCOMFORT
DESCRIPTORS: ACHING;DISCOMFORT
DESCRIPTORS: ACHING;DISCOMFORT;SHOOTING
DESCRIPTORS: ACHING;TIGHTNESS
DESCRIPTORS: ACHING;DISCOMFORT

## 2025-08-29 ASSESSMENT — PAIN DESCRIPTION - LOCATION
LOCATION: KNEE
LOCATION: RIB CAGE
LOCATION: LEG

## 2025-08-30 VITALS
HEART RATE: 64 BPM | WEIGHT: 151.1 LBS | TEMPERATURE: 98.5 F | OXYGEN SATURATION: 100 % | SYSTOLIC BLOOD PRESSURE: 108 MMHG | BODY MASS INDEX: 25.18 KG/M2 | RESPIRATION RATE: 20 BRPM | DIASTOLIC BLOOD PRESSURE: 57 MMHG | HEIGHT: 65 IN

## 2025-08-30 LAB
ALBUMIN SERPL BCP-MCNC: 2.9 G/DL (ref 3.4–5)
ANION GAP SERPL CALC-SCNC: 9 MMOL/L (ref 10–20)
BUN SERPL-MCNC: 15 MG/DL (ref 6–23)
CALCIUM SERPL-MCNC: 8.3 MG/DL (ref 8.6–10.3)
CHLORIDE SERPL-SCNC: 104 MMOL/L (ref 98–107)
CO2 SERPL-SCNC: 25 MMOL/L (ref 21–32)
CREAT SERPL-MCNC: 0.71 MG/DL (ref 0.5–1.05)
EGFRCR SERPLBLD CKD-EPI 2021: 86 ML/MIN/1.73M*2
ERYTHROCYTE [DISTWIDTH] IN BLOOD BY AUTOMATED COUNT: 12.6 % (ref 11.5–14.5)
GLUCOSE SERPL-MCNC: 90 MG/DL (ref 74–99)
HCT VFR BLD AUTO: 31.4 % (ref 36–46)
HGB BLD-MCNC: 10.5 G/DL (ref 12–16)
MCH RBC QN AUTO: 31.8 PG (ref 26–34)
MCHC RBC AUTO-ENTMCNC: 33.4 G/DL (ref 32–36)
MCV RBC AUTO: 95 FL (ref 80–100)
NRBC BLD-RTO: 0 /100 WBCS (ref 0–0)
PHOSPHATE SERPL-MCNC: 2.7 MG/DL (ref 2.5–4.9)
PLATELET # BLD AUTO: 176 X10*3/UL (ref 150–450)
POTASSIUM SERPL-SCNC: 3.7 MMOL/L (ref 3.5–5.3)
RBC # BLD AUTO: 3.3 X10*6/UL (ref 4–5.2)
SODIUM SERPL-SCNC: 134 MMOL/L (ref 136–145)
WBC # BLD AUTO: 7.1 X10*3/UL (ref 4.4–11.3)

## 2025-08-30 PROCEDURE — 85027 COMPLETE CBC AUTOMATED: CPT | Performed by: INTERNAL MEDICINE

## 2025-08-30 PROCEDURE — 97530 THERAPEUTIC ACTIVITIES: CPT | Mod: CQ,GP

## 2025-08-30 PROCEDURE — 2500000001 HC RX 250 WO HCPCS SELF ADMINISTERED DRUGS (ALT 637 FOR MEDICARE OP): Performed by: HOSPITALIST

## 2025-08-30 PROCEDURE — 36415 COLL VENOUS BLD VENIPUNCTURE: CPT | Performed by: INTERNAL MEDICINE

## 2025-08-30 PROCEDURE — 82565 ASSAY OF CREATININE: CPT | Performed by: INTERNAL MEDICINE

## 2025-08-30 PROCEDURE — 2500000001 HC RX 250 WO HCPCS SELF ADMINISTERED DRUGS (ALT 637 FOR MEDICARE OP): Performed by: PHARMACIST

## 2025-08-30 RX ORDER — ACETAMINOPHEN 325 MG/1
650 TABLET ORAL EVERY 4 HOURS PRN
Start: 2025-08-30

## 2025-08-30 RX ORDER — SENNOSIDES 8.6 MG/1
2 TABLET ORAL 2 TIMES DAILY
Start: 2025-08-30

## 2025-08-30 RX ORDER — ONDANSETRON 4 MG/1
4 TABLET, ORALLY DISINTEGRATING ORAL EVERY 8 HOURS PRN
Start: 2025-08-30

## 2025-08-30 RX ORDER — OXYCODONE HYDROCHLORIDE 5 MG/1
5 TABLET ORAL EVERY 4 HOURS PRN
Qty: 18 TABLET | Refills: 0 | Status: SHIPPED | OUTPATIENT
Start: 2025-08-30

## 2025-08-30 RX ADMIN — FAMOTIDINE 40 MG: 20 TABLET, FILM COATED ORAL at 08:40

## 2025-08-30 RX ADMIN — SENNOSIDES 17.2 MG: 8.6 TABLET, FILM COATED ORAL at 08:40

## 2025-08-30 RX ADMIN — Medication 1000 UNITS: at 08:40

## 2025-08-30 RX ADMIN — OXYCODONE HYDROCHLORIDE 5 MG: 5 TABLET ORAL at 00:03

## 2025-08-30 RX ADMIN — APIXABAN 2.5 MG: 2.5 TABLET, FILM COATED ORAL at 08:40

## 2025-08-30 RX ADMIN — METOPROLOL SUCCINATE 25 MG: 25 TABLET, EXTENDED RELEASE ORAL at 08:40

## 2025-08-30 RX ADMIN — PANTOPRAZOLE SODIUM 40 MG: 40 TABLET, DELAYED RELEASE ORAL at 06:35

## 2025-08-30 RX ADMIN — OXYCODONE HYDROCHLORIDE 5 MG: 5 TABLET ORAL at 13:31

## 2025-08-30 RX ADMIN — OXYCODONE HYDROCHLORIDE 5 MG: 5 TABLET ORAL at 08:41

## 2025-08-30 ASSESSMENT — PAIN DESCRIPTION - ORIENTATION
ORIENTATION: RIGHT
ORIENTATION: RIGHT

## 2025-08-30 ASSESSMENT — PAIN SCALES - GENERAL
PAINLEVEL_OUTOF10: 4
PAINLEVEL_OUTOF10: 10 - WORST POSSIBLE PAIN
PAINLEVEL_OUTOF10: 8
PAINLEVEL_OUTOF10: 8
PAINLEVEL_OUTOF10: 6

## 2025-08-30 ASSESSMENT — PAIN DESCRIPTION - LOCATION
LOCATION: RIB CAGE
LOCATION: KNEE
LOCATION: KNEE

## 2025-08-30 ASSESSMENT — PAIN DESCRIPTION - DESCRIPTORS: DESCRIPTORS: ACHING;DISCOMFORT

## 2025-08-30 ASSESSMENT — COGNITIVE AND FUNCTIONAL STATUS - GENERAL
WALKING IN HOSPITAL ROOM: TOTAL
HELP NEEDED FOR BATHING: A LOT
MOVING FROM LYING ON BACK TO SITTING ON SIDE OF FLAT BED WITH BEDRAILS: A LOT
CLIMB 3 TO 5 STEPS WITH RAILING: TOTAL
STANDING UP FROM CHAIR USING ARMS: TOTAL
EATING MEALS: A LITTLE
MOVING TO AND FROM BED TO CHAIR: A LOT
TOILETING: A LOT
TURNING FROM BACK TO SIDE WHILE IN FLAT BAD: TOTAL
MOVING FROM LYING ON BACK TO SITTING ON SIDE OF FLAT BED WITH BEDRAILS: A LOT
WALKING IN HOSPITAL ROOM: TOTAL
MOBILITY SCORE: 7
DRESSING REGULAR UPPER BODY CLOTHING: A LOT
CLIMB 3 TO 5 STEPS WITH RAILING: TOTAL
MOBILITY SCORE: 10
STANDING UP FROM CHAIR USING ARMS: A LOT
MOVING TO AND FROM BED TO CHAIR: TOTAL
DAILY ACTIVITIY SCORE: 14
PERSONAL GROOMING: A LITTLE
DRESSING REGULAR LOWER BODY CLOTHING: A LOT
TURNING FROM BACK TO SIDE WHILE IN FLAT BAD: A LOT

## 2025-08-30 ASSESSMENT — PAIN - FUNCTIONAL ASSESSMENT
PAIN_FUNCTIONAL_ASSESSMENT: 0-10
PAIN_FUNCTIONAL_ASSESSMENT: 0-10
PAIN_FUNCTIONAL_ASSESSMENT: UNABLE TO SELF-REPORT
PAIN_FUNCTIONAL_ASSESSMENT: 0-10
PAIN_FUNCTIONAL_ASSESSMENT: 0-10

## 2025-08-30 ASSESSMENT — ACTIVITIES OF DAILY LIVING (ADL): EFFECT OF PAIN ON DAILY ACTIVITIES: LIMITED MOBILITY

## 2025-09-02 ENCOUNTER — APPOINTMENT (OUTPATIENT)
Dept: OTOLARYNGOLOGY | Facility: CLINIC | Age: 80
End: 2025-09-02
Payer: MEDICARE

## 2025-09-24 ENCOUNTER — APPOINTMENT (OUTPATIENT)
Dept: ORTHOPEDIC SURGERY | Facility: HOSPITAL | Age: 80
End: 2025-09-24
Payer: MEDICARE

## 2025-09-30 ENCOUNTER — APPOINTMENT (OUTPATIENT)
Dept: GASTROENTEROLOGY | Facility: CLINIC | Age: 80
End: 2025-09-30
Payer: MEDICARE

## 2025-10-13 ENCOUNTER — APPOINTMENT (OUTPATIENT)
Dept: OTOLARYNGOLOGY | Facility: CLINIC | Age: 80
End: 2025-10-13
Payer: MEDICARE